# Patient Record
Sex: FEMALE | Race: WHITE | NOT HISPANIC OR LATINO | Employment: FULL TIME | URBAN - METROPOLITAN AREA
[De-identification: names, ages, dates, MRNs, and addresses within clinical notes are randomized per-mention and may not be internally consistent; named-entity substitution may affect disease eponyms.]

---

## 2019-05-27 ENCOUNTER — HOSPITAL ENCOUNTER (INPATIENT)
Facility: HOSPITAL | Age: 29
LOS: 1 days | Discharge: HOME | End: 2019-05-28
Attending: OBSTETRICS & GYNECOLOGY | Admitting: OBSTETRICS & GYNECOLOGY
Payer: MEDICAID

## 2019-05-27 DIAGNOSIS — O36.4XX0 IUFD AT 20 WEEKS OR MORE OF GESTATION: Primary | ICD-10-CM

## 2019-05-27 DIAGNOSIS — O09.32 NO PRENATAL CARE IN CURRENT PREGNANCY IN SECOND TRIMESTER: ICD-10-CM

## 2019-05-27 LAB
ABO + RH BLD: NORMAL
AMPHET+METHAMPHET UR QL: NEGATIVE
BACTERIA #/AREA URNS HPF: ABNORMAL /HPF
BARBITURATES UR QL SCN>200 NG/ML: NEGATIVE
BASOPHILS # BLD AUTO: 0.03 K/UL (ref 0–0.2)
BASOPHILS NFR BLD: 0.2 % (ref 0–1.9)
BENZODIAZ UR QL SCN>200 NG/ML: NEGATIVE
BILIRUB UR QL STRIP: ABNORMAL
BLD GP AB SCN CELLS X3 SERPL QL: NORMAL
BZE UR QL SCN: NEGATIVE
CANNABINOIDS UR QL SCN: NEGATIVE
CLARITY UR: ABNORMAL
COLOR UR: ABNORMAL
CREAT UR-MCNC: 75.7 MG/DL (ref 15–325)
DIFFERENTIAL METHOD: ABNORMAL
EOSINOPHIL # BLD AUTO: 0.3 K/UL (ref 0–0.5)
EOSINOPHIL NFR BLD: 2 % (ref 0–8)
ERYTHROCYTE [DISTWIDTH] IN BLOOD BY AUTOMATED COUNT: 12.8 % (ref 11.5–14.5)
GLUCOSE UR QL STRIP: ABNORMAL
HCT VFR BLD AUTO: 29.7 % (ref 37–48.5)
HGB BLD-MCNC: 10.4 G/DL (ref 12–16)
HGB UR QL STRIP: ABNORMAL
IMM GRANULOCYTES # BLD AUTO: 0.13 K/UL (ref 0–0.04)
IMM GRANULOCYTES NFR BLD AUTO: 0.9 % (ref 0–0.5)
KETONES UR QL STRIP: ABNORMAL
LEUKOCYTE ESTERASE UR QL STRIP: ABNORMAL
LYMPHOCYTES # BLD AUTO: 2.8 K/UL (ref 1–4.8)
LYMPHOCYTES NFR BLD: 18.8 % (ref 18–48)
MCH RBC QN AUTO: 32.7 PG (ref 27–31)
MCHC RBC AUTO-ENTMCNC: 35 G/DL (ref 32–36)
MCV RBC AUTO: 93 FL (ref 82–98)
MICROSCOPIC COMMENT: ABNORMAL
MONOCYTES # BLD AUTO: 0.7 K/UL (ref 0.3–1)
MONOCYTES NFR BLD: 4.8 % (ref 4–15)
NEUTROPHILS # BLD AUTO: 10.9 K/UL (ref 1.8–7.7)
NEUTROPHILS NFR BLD: 73.3 % (ref 38–73)
NITRITE UR QL STRIP: ABNORMAL
NRBC BLD-RTO: 0 /100 WBC
OPIATES UR QL SCN: NEGATIVE
PCP UR QL SCN>25 NG/ML: NEGATIVE
PH UR STRIP: ABNORMAL [PH] (ref 5–8)
PLATELET # BLD AUTO: 217 K/UL (ref 150–350)
PMV BLD AUTO: 9.8 FL (ref 9.2–12.9)
PROT UR QL STRIP: ABNORMAL
RBC # BLD AUTO: 3.18 M/UL (ref 4–5.4)
RBC #/AREA URNS HPF: >100 /HPF (ref 0–4)
SP GR UR STRIP: ABNORMAL (ref 1–1.03)
TOXICOLOGY INFORMATION: NORMAL
URN SPEC COLLECT METH UR: ABNORMAL
UROBILINOGEN UR STRIP-ACNC: ABNORMAL EU/DL
WBC # BLD AUTO: 14.86 K/UL (ref 3.9–12.7)

## 2019-05-27 PROCEDURE — 99211 OFF/OP EST MAY X REQ PHY/QHP: CPT | Mod: 25

## 2019-05-27 PROCEDURE — 88305 TISSUE EXAM BY PATHOLOGIST: CPT | Performed by: PATHOLOGY

## 2019-05-27 PROCEDURE — 85025 COMPLETE CBC W/AUTO DIFF WBC: CPT

## 2019-05-27 PROCEDURE — 63600175 PHARM REV CODE 636 W HCPCS: Performed by: OBSTETRICS & GYNECOLOGY

## 2019-05-27 PROCEDURE — 86901 BLOOD TYPING SEROLOGIC RH(D): CPT

## 2019-05-27 PROCEDURE — 87491 CHLMYD TRACH DNA AMP PROBE: CPT

## 2019-05-27 PROCEDURE — 88305 TISSUE EXAM BY PATHOLOGIST: CPT | Mod: 26,,, | Performed by: PATHOLOGY

## 2019-05-27 PROCEDURE — 25000003 PHARM REV CODE 250: Performed by: OBSTETRICS & GYNECOLOGY

## 2019-05-27 PROCEDURE — 36415 COLL VENOUS BLD VENIPUNCTURE: CPT

## 2019-05-27 PROCEDURE — 11000001 HC ACUTE MED/SURG PRIVATE ROOM

## 2019-05-27 PROCEDURE — 72200004 HC VAGINAL DELIVERY LEVEL I

## 2019-05-27 PROCEDURE — 80307 DRUG TEST PRSMV CHEM ANLYZR: CPT

## 2019-05-27 PROCEDURE — 88305 TISSUE SPECIMEN TO PATHOLOGY: ICD-10-PCS | Mod: 26,,, | Performed by: PATHOLOGY

## 2019-05-27 PROCEDURE — 72100002 HC LABOR CARE, 1ST 8 HOURS

## 2019-05-27 PROCEDURE — 72200005 HC VAGINAL DELIVERY LEVEL II

## 2019-05-27 PROCEDURE — 96374 THER/PROPH/DIAG INJ IV PUSH: CPT

## 2019-05-27 PROCEDURE — 81000 URINALYSIS NONAUTO W/SCOPE: CPT | Mod: 59

## 2019-05-27 PROCEDURE — 63600175 PHARM REV CODE 636 W HCPCS

## 2019-05-27 RX ORDER — DOCUSATE SODIUM 100 MG/1
200 CAPSULE, LIQUID FILLED ORAL 2 TIMES DAILY PRN
Status: DISCONTINUED | OUTPATIENT
Start: 2019-05-27 | End: 2019-05-28 | Stop reason: HOSPADM

## 2019-05-27 RX ORDER — PROMETHAZINE HYDROCHLORIDE 25 MG/ML
INJECTION, SOLUTION INTRAMUSCULAR; INTRAVENOUS
Status: COMPLETED
Start: 2019-05-27 | End: 2019-05-27

## 2019-05-27 RX ORDER — DIPHENHYDRAMINE HYDROCHLORIDE 50 MG/ML
25 INJECTION INTRAMUSCULAR; INTRAVENOUS EVERY 4 HOURS PRN
Status: DISCONTINUED | OUTPATIENT
Start: 2019-05-27 | End: 2019-05-28 | Stop reason: HOSPADM

## 2019-05-27 RX ORDER — HYDROCODONE BITARTRATE AND ACETAMINOPHEN 7.5; 325 MG/1; MG/1
1 TABLET ORAL EVERY 4 HOURS PRN
Status: DISCONTINUED | OUTPATIENT
Start: 2019-05-27 | End: 2019-05-28 | Stop reason: HOSPADM

## 2019-05-27 RX ORDER — ACETAMINOPHEN 325 MG/1
650 TABLET ORAL EVERY 6 HOURS PRN
Status: DISCONTINUED | OUTPATIENT
Start: 2019-05-27 | End: 2019-05-28 | Stop reason: HOSPADM

## 2019-05-27 RX ORDER — ONDANSETRON 4 MG/1
8 TABLET, ORALLY DISINTEGRATING ORAL EVERY 8 HOURS PRN
Status: DISCONTINUED | OUTPATIENT
Start: 2019-05-27 | End: 2019-05-28 | Stop reason: HOSPADM

## 2019-05-27 RX ORDER — DIPHENHYDRAMINE HCL 25 MG
25 CAPSULE ORAL EVERY 4 HOURS PRN
Status: DISCONTINUED | OUTPATIENT
Start: 2019-05-27 | End: 2019-05-28 | Stop reason: HOSPADM

## 2019-05-27 RX ORDER — BUTORPHANOL TARTRATE 2 MG/ML
INJECTION INTRAMUSCULAR; INTRAVENOUS
Status: COMPLETED
Start: 2019-05-27 | End: 2019-05-27

## 2019-05-27 RX ORDER — SODIUM CHLORIDE 0.9 % (FLUSH) 0.9 %
3 SYRINGE (ML) INJECTION EVERY 8 HOURS
Status: DISCONTINUED | OUTPATIENT
Start: 2019-05-27 | End: 2019-05-28 | Stop reason: HOSPADM

## 2019-05-27 RX ORDER — OXYTOCIN/RINGER'S LACTATE 20/1000 ML
41.65 PLASTIC BAG, INJECTION (ML) INTRAVENOUS CONTINUOUS
Status: DISCONTINUED | OUTPATIENT
Start: 2019-05-27 | End: 2019-05-28

## 2019-05-27 RX ORDER — HYDROCODONE BITARTRATE AND ACETAMINOPHEN 10; 325 MG/1; MG/1
1 TABLET ORAL EVERY 4 HOURS PRN
Status: DISCONTINUED | OUTPATIENT
Start: 2019-05-27 | End: 2019-05-28 | Stop reason: HOSPADM

## 2019-05-27 RX ORDER — OXYTOCIN/RINGER'S LACTATE 20/1000 ML
PLASTIC BAG, INJECTION (ML) INTRAVENOUS
Status: COMPLETED
Start: 2019-05-27 | End: 2019-05-27

## 2019-05-27 RX ADMIN — Medication 333 MILLI-UNITS/MIN: at 04:05

## 2019-05-27 RX ADMIN — Medication 333 MILLI-UNITS/MIN: at 05:05

## 2019-05-27 RX ADMIN — PROMETHAZINE HYDROCHLORIDE: 25 INJECTION INTRAMUSCULAR; INTRAVENOUS at 04:05

## 2019-05-27 RX ADMIN — HYDROCODONE BITARTRATE AND ACETAMINOPHEN 1 TABLET: 10; 325 TABLET ORAL at 08:05

## 2019-05-27 RX ADMIN — BUTORPHANOL TARTRATE: 2 INJECTION, SOLUTION INTRAMUSCULAR; INTRAVENOUS at 04:05

## 2019-05-27 NOTE — H&P
[facility] - Labor and Delivery  Obstetrics  History & Physical    Patient Name: Felicitas Santos  MRN: [id number]  Admission Date: 2019  Primary Care Provider: Primary Doctor No    Subjective:     Principal Problem:IUFD at 20 weeks or more of gestation    History of Present Illness:  28-year-old  89 presents to Labor and delivery with no prenatal care. The patient was only seen [facility] the [facility] department and was given an estimated due date based on her LMP.  NILE 2019.  With a gestational age of 23-,1/7 weeks.  She presented with complaints of contractions.  The nurse was unable to ascertain fetal heart tones.  Bedside ultrasound confirmed no fetal heart tones and the fetus in the earl breech position inside the vagina.  She was grossly ruptured.  Within a minute of performing the ultrasound the patient delivered the fetus as well as the placenta.  See the delivery summary.    Obstetric HPI:  Patient reports Date/time of onset:  Today at 1:30 a.m. contractions, active fetal movement, patient reported that she felt her fetus move this morning.  Yes vaginal bleeding , Yes loss of fluid     This pregnancy has been complicated by no prenatal care.  History of  delivery x2.  Tobacco abuse.    OB History    Para Term  AB Living   5 2 0 2 0 2   SAB TAB Ectopic Multiple Live Births   0 0 0 0 2      # Outcome Date GA Lbr Tru/2nd Weight Sex Delivery Anes PTL Lv   5 Current            4             3             2             1               No past medical history on file.  No past surgical history on file.    No medications prior to admission.       Review of patient's allergies indicates:  Allergies not on file     Family History     None        Tobacco Use    Smoking status: Not on file   Substance and Sexual Activity    Alcohol use: Not on file    Drug use: Not on file    Sexual activity: Not on file     Review of Systems    Constitutional: Negative.    HENT: Negative.    Eyes: Negative.    Respiratory: Negative.    Cardiovascular: Negative.    Gastrointestinal: Positive for abdominal pain.   Endocrine: Negative.    Genitourinary: Positive for pelvic pain, vaginal bleeding and vaginal discharge.   Musculoskeletal: Negative.    Integumentary:  Negative.   Neurological: Negative.    Hematological: Negative.    Psychiatric/Behavioral: Negative.    Breast: negative.       Objective:     Vital Signs (Most Recent):    Vital Signs (24h Range):  BP: ()/()   Arterial Line BP: ()/()         There is no height or weight on file to calculate BMI.    FHT:  Unable to retrieve fetal heart tones.  Bedside ultrasound negative for fetal heart tones.  TOCO:  Q 3 minutes    Physical Exam:   Constitutional: She is oriented to person, place, and time. She appears well-developed and well-nourished. She appears distressed.    HENT:   Head: Normocephalic and atraumatic.   Right Ear: External ear normal.   Left Ear: External ear normal.   Nose: Nose normal.   Mouth/Throat: Oropharynx is clear and moist.    Eyes: Pupils are equal, round, and reactive to light. Conjunctivae and EOM are normal.    Neck: Normal range of motion. Neck supple.    Cardiovascular: Normal rate, regular rhythm, normal heart sounds and intact distal pulses.     Pulmonary/Chest: Effort normal and breath sounds normal.        Abdominal: Soft. Bowel sounds are normal.             Musculoskeletal: Normal range of motion and moves all extremeties.       Neurological: She is alert and oriented to person, place, and time. She has normal reflexes.    Skin: Skin is warm and dry.    Psychiatric: She has a normal mood and affect. Her behavior is normal. Judgment and thought content normal.       Cervix:  Dilation:  8  Effacement:  100%  Station: 2  Presentation: Breech     Significant Labs:  No results found for: GROUPTRH, HEPBSAG, RUBELLAIGGSC, STREPBCULT, AFP, SECWSLA1ZC    I have personallly  reviewed all pertinent lab results from the last 24 hours.  Recent Lab Results       19  1614        Baso # 0.03     Basophil% 0.2     Differential Method Automated     Eos # 0.3     Eosinophil% 2.0     Gran # (ANC) 10.9     Gran% 73.3     Hematocrit 29.7     Hemoglobin 10.4     Immature Grans (Abs) 0.13  Comment:  Mild elevation in immature granulocytes is non specific and   can be seen in a variety of conditions including stress response,   acute inflammation, trauma and pregnancy. Correlation with other   laboratory and clinical findings is essential.       Immature Granulocytes 0.9     Lymph # 2.8     Lymph% 18.8     MCH 32.7     MCHC 35.0     MCV 93     Mono # 0.7     Mono% 4.8     MPV 9.8     nRBC 0     Platelets 217     RBC 3.18     RDW 12.8     WBC 14.86           Assessment/Plan:     28 y.o. female  at 23w1d for:  IUFD.   labor.  No prenatal care.  See delivery summary.            Cody Payne MD  Obstetrics  [facility] - Labor and Delivery

## 2019-05-28 VITALS
BODY MASS INDEX: 28.41 KG/M2 | DIASTOLIC BLOOD PRESSURE: 60 MMHG | HEIGHT: 67 IN | RESPIRATION RATE: 18 BRPM | TEMPERATURE: 98 F | WEIGHT: 181 LBS | SYSTOLIC BLOOD PRESSURE: 105 MMHG | OXYGEN SATURATION: 97 % | HEART RATE: 86 BPM

## 2019-05-28 LAB
ABO + RH BLD: NORMAL
AMPHET+METHAMPHET UR QL: NEGATIVE
BACTERIA #/AREA URNS HPF: ABNORMAL /HPF
BARBITURATES UR QL SCN>200 NG/ML: NEGATIVE
BENZODIAZ UR QL SCN>200 NG/ML: NEGATIVE
BILIRUB UR QL STRIP: NEGATIVE
BLD GP AB SCN CELLS X3 SERPL QL: NORMAL
BZE UR QL SCN: NEGATIVE
C TRACH DNA SPEC QL NAA+PROBE: NOT DETECTED
CANNABINOIDS UR QL SCN: NEGATIVE
CLARITY UR: ABNORMAL
CMV IGG SERPL QL IA: REACTIVE
COLOR UR: ABNORMAL
CREAT UR-MCNC: 60.7 MG/DL (ref 15–325)
ESTIMATED AVG GLUCOSE: 74 MG/DL (ref 68–131)
FETAL RBC/1000 RBC BLD KLEIH BETKE-RTO: NORMAL
GLUCOSE UR QL STRIP: NEGATIVE
HBA1C MFR BLD HPLC: 4.2 % (ref 4.5–6.2)
HGB UR QL STRIP: ABNORMAL
HYALINE CASTS #/AREA URNS LPF: 0 /LPF
INR PPP: 0.9 (ref 0.8–1.2)
KETONES UR QL STRIP: NEGATIVE
LEUKOCYTE ESTERASE UR QL STRIP: NEGATIVE
MICROSCOPIC COMMENT: ABNORMAL
N GONORRHOEA DNA SPEC QL NAA+PROBE: NOT DETECTED
NITRITE UR QL STRIP: NEGATIVE
OPIATES UR QL SCN: NEGATIVE
PATHOLOGIST INTERPRETATION KBT: NORMAL
PCP UR QL SCN>25 NG/ML: NEGATIVE
PH UR STRIP: >8 [PH] (ref 5–8)
PROT UR QL STRIP: ABNORMAL
PROTHROMBIN TIME: 10.6 SEC (ref 9–12.5)
RBC #/AREA URNS HPF: 100 /HPF (ref 0–4)
SP GR UR STRIP: 1.01 (ref 1–1.03)
TOXICOLOGY INFORMATION: NORMAL
TSH SERPL DL<=0.005 MIU/L-ACNC: 1.25 UIU/ML (ref 0.34–5.6)
URN SPEC COLLECT METH UR: ABNORMAL
UROBILINOGEN UR STRIP-ACNC: 1 EU/DL
WBC #/AREA URNS HPF: 0 /HPF (ref 0–5)

## 2019-05-28 PROCEDURE — 85610 PROTHROMBIN TIME: CPT

## 2019-05-28 PROCEDURE — 85460 HEMOGLOBIN FETAL: CPT

## 2019-05-28 PROCEDURE — 81000 URINALYSIS NONAUTO W/SCOPE: CPT | Mod: 59

## 2019-05-28 PROCEDURE — 86147 CARDIOLIPIN ANTIBODY EA IG: CPT

## 2019-05-28 PROCEDURE — 25000003 PHARM REV CODE 250

## 2019-05-28 PROCEDURE — 86747 PARVOVIRUS ANTIBODY: CPT

## 2019-05-28 PROCEDURE — 85060 BLOOD SMEAR INTERPRETATION: CPT | Mod: ,,, | Performed by: PATHOLOGY

## 2019-05-28 PROCEDURE — 36415 COLL VENOUS BLD VENIPUNCTURE: CPT

## 2019-05-28 PROCEDURE — 87529 HSV DNA AMP PROBE: CPT

## 2019-05-28 PROCEDURE — 86644 CMV ANTIBODY: CPT

## 2019-05-28 PROCEDURE — 85300 ANTITHROMBIN III ACTIVITY: CPT

## 2019-05-28 PROCEDURE — 86901 BLOOD TYPING SEROLOGIC RH(D): CPT

## 2019-05-28 PROCEDURE — 85060 PATHOLOGIST INTERPRETATION KBT: ICD-10-PCS | Mod: ,,, | Performed by: PATHOLOGY

## 2019-05-28 PROCEDURE — 80307 DRUG TEST PRSMV CHEM ANLYZR: CPT

## 2019-05-28 PROCEDURE — 83036 HEMOGLOBIN GLYCOSYLATED A1C: CPT

## 2019-05-28 PROCEDURE — 25000003 PHARM REV CODE 250: Performed by: OBSTETRICS & GYNECOLOGY

## 2019-05-28 PROCEDURE — 85613 RUSSELL VIPER VENOM DILUTED: CPT

## 2019-05-28 PROCEDURE — 86592 SYPHILIS TEST NON-TREP QUAL: CPT

## 2019-05-28 PROCEDURE — 84443 ASSAY THYROID STIM HORMONE: CPT

## 2019-05-28 PROCEDURE — 86850 RBC ANTIBODY SCREEN: CPT

## 2019-05-28 RX ORDER — OXYTOCIN/RINGER'S LACTATE 20/1000 ML
2 PLASTIC BAG, INJECTION (ML) INTRAVENOUS CONTINUOUS
Status: DISCONTINUED | OUTPATIENT
Start: 2019-05-28 | End: 2019-05-28

## 2019-05-28 RX ORDER — HYDROCODONE BITARTRATE AND ACETAMINOPHEN 7.5; 325 MG/1; MG/1
1 TABLET ORAL EVERY 8 HOURS PRN
Qty: 10 TABLET | Refills: 0 | Status: ON HOLD | OUTPATIENT
Start: 2019-05-28 | End: 2019-07-19 | Stop reason: SDUPTHER

## 2019-05-28 RX ORDER — DOCUSATE SODIUM 100 MG/1
200 CAPSULE, LIQUID FILLED ORAL 2 TIMES DAILY PRN
Refills: 0 | COMMUNITY
Start: 2019-05-28 | End: 2019-12-18

## 2019-05-28 RX ORDER — NAPROXEN 250 MG/1
500 TABLET ORAL ONCE
Status: COMPLETED | OUTPATIENT
Start: 2019-05-28 | End: 2019-05-28

## 2019-05-28 RX ORDER — NAPROXEN 250 MG/1
TABLET ORAL
Status: COMPLETED
Start: 2019-05-28 | End: 2019-05-28

## 2019-05-28 RX ADMIN — DOCUSATE SODIUM 200 MG: 100 CAPSULE, LIQUID FILLED ORAL at 09:05

## 2019-05-28 RX ADMIN — NAPROXEN 500 MG: 250 TABLET ORAL at 10:05

## 2019-05-28 RX ADMIN — HYDROCODONE BITARTRATE AND ACETAMINOPHEN 1 TABLET: 10; 325 TABLET ORAL at 09:05

## 2019-05-28 RX ADMIN — HYDROCODONE BITARTRATE AND ACETAMINOPHEN 1 TABLET: 10; 325 TABLET ORAL at 02:05

## 2019-05-28 NOTE — NURSING
Pt alert eating a hamburger at this time with family in room  IV LR with Pitocin complete, switched to LR at 125cc/hr. Jm doan

## 2019-05-28 NOTE — NURSING
PT IN ROOM 148.  IV HEPLOCKED.    FOOTPRINTED, DRESSED, TO MOTHER FOR GRIEVING/BONDING.  MOTHER'S AFFECT IS FLAT, DOES NOT SEEM EMOTIONALLY ATTACHED, JUST QUIET AND DOES NOT SAY MUCH.  MEMORY BOX WITH FOOTPRINTS PREPARED TO GIVE TO MOTHER PRIOR DISCHARGE.  BABY WAS IN MOTHERS ARMS FOR APPROX 45 MINS THEN SHE ADVISED OK TO RETURN BABY AND SEND TO  HOME.   HOME NOTIF BY RADHA HERNANDEZ.

## 2019-05-28 NOTE — L&D DELIVERY NOTE
[facility] - Labor and Delivery  Vaginal Delivery   Operative Note    SUMMARY     Normal spontaneous vaginal delivery of IUFD Pt ruptured while in the bathroom. No fetal heart tones at bedside US.Buttock in the vagina on initial exam.  Infant delivered position earl breech over intact perineum.  Nuchal cord: No.    Spontaneous delivery of placenta and IV pitocin given noting good uterine tone.  No lacerations noted.  Patient tolerated delivery well. Sponge needle and lap counted correctly x2.  Apgars 0,0 EBL= 100cc  IUFD panel ordered. Placenta to path.  Indications: IUFD at 20 weeks or more of gestation  Pregnancy complicated by:   Patient Active Problem List   Diagnosis    IUFD at 20 weeks or more of gestation     labor in second trimester with  delivery    No prenatal care in current pregnancy in second trimester     Admitting GA: 23w1d    This patient has no babies on file.

## 2019-05-28 NOTE — NURSING
Body of baby picked up by [facility] .  Will call family in AM for final burial arrangements. Pt notified

## 2019-05-28 NOTE — NURSING
New urine obtained for more tests  Blood drawn for lab  Vital signs taken, WNL  Vaginal wall swabs x 2 taken  Pt alert and c/o cramping, medicated.  Pt more talkative about baby now, states his name is Fleet.  Gave memory box with footprints, Emotional support offered.

## 2019-05-28 NOTE — NURSING
1535-Patient arrived to LDR1 as OB triage for c/o contractions that started around 1400 today. Disrobed and gowned. Urine cup provided for UA--LW.   1540-Called to room by patient's ; states she thinks her water has broken. Assisted patient back to bed; bloody show noted. Fetal monitors applied for NST but unable to find heart tones. Second nurse called to bedside to attempt to find fetal heart tones--LW.  1604-Called Dr. Payne to provide patient update and advise that both nurses have been unable to find fetal heart tones. Telephone orders received for STAT US, UDS, CBC, T&S, & insert Heplock IV--LW.   1610-Dr. Payne at bedside to perform US. No fetal heart tones detected per US. SVE performed per Dr. Payne and infant determined to be in vaginal vault--LW.   1614-Patient instructed to push per Dr. Payne.  of nonviable male with Dr. Payne present for delivery--LW.

## 2019-05-28 NOTE — NURSING
Spoke with Ms. Felicitas Santos and her  at patient's bedside prior to discharge. Offered resources for infant loss support. Gave information on speaking with Toña Huerta, Ph # [phone], President of [employer]. Toña will  parents with infant loss. Also offered [facility] Resources and phone number, [phone]. Parents received written information with contact numbers. Parents verbalized understanding . Mom tearful but receptive.

## 2019-05-28 NOTE — NURSING
1015-Discharge instructions for Stillbirth discussed with patient and copy provided. Instructed to f/u with Dr. Payne on 6/6/19 @ 1400. Prescription for Norco given to patient and instructed on use. Also informed patient to begin taking Colace 200 mg PO BID and Naproxen 500 mg PO q 8 hours for cramping. Patient verbalizes understanding of all materials discussed. Denies any further needs or concerns at this time--LW.   1023-Discharged home. Ambulated to POV per request with assistance from . No s/s distress noted at time of discharge--LW.

## 2019-05-28 NOTE — PLAN OF CARE
19 1519   Final Note   Assessment Type Final Discharge Note   Anticipated Discharge Disposition Home   Hospital Follow Up  Appt(s) scheduled? Yes   Discharge plans and expectations educations in teach back method with documentation complete? Yes   PT ADMITTED WITH IUFD AT 20 WEEKS OR MORE. SHE IS REFERRED TO [facility] RESOURCESFOR F/U EMOTIONAL SUPPORT.  NP HAS CONTACT WITH SUPPORT PERSON WHO WORKS WITH [employer] AND SHE OFFERED TO DISCUSS THIS RESOURCE  WITH PT. PT IS DISCHARGED HOME AFTER RESOURCE DISCUSSION. F/U APPOINTMENT MADE WITH DR HARPER.

## 2019-05-29 LAB
HSV1 DNA SPEC QL NAA+PROBE: NEGATIVE
HSV2 DNA SPEC QL NAA+PROBE: NEGATIVE
RPR SER QL: NORMAL
SPECIMEN SOURCE: NORMAL

## 2019-05-29 NOTE — DISCHARGE SUMMARY
[facility] - Post Partum  Obstetrics & Gynecology  Discharge Summary    Patient Name: Felicitas Santos  MRN: [id number]  Admission Date: 2019  Hospital Length of Stay: 1 days  Discharge Date and Time: 2019 10:23 AM  Attending Physician: No att. providers found   Discharging Provider: Cody Payne MD  Primary Care Provider: Primary Doctor Leeann    HPI:  28-year-old  89 presented to Labor and delivery with no prenatal care. According to an LMP the patient was 23 weeks and 3 days.  The patient presented with complaints of liz.  Upon arrival the patient was noted to have spontaneous rupture of membranes.  The nurse was unable to ascertain fetal heart tones.  Bedside ultrasound confirmed IUFD.    Hospital Course:  The patient precipitously delivered in the breech presentation.  Fluid was clear.  There were no gross anomalies of the fetus.  It was a male fetus.  The patient deliver the placenta with no complications. She was held overnight to assess bleeding.  The patient did well and was discharged to home on post delivery day 1.    * No surgery found *     Consults:     Significant Diagnostic Studies:   Labs: All labs within the past 24 hours have been reviewed  Specimen (12h ago, onward)    None          Pending Diagnostic Studies:     Procedure Component Value Units Date/Time    Antithrombin III [[id number]] Collected:  19 0200    Order Status:  Sent Lab Status:  In process Updated:  19 0231    Specimen:  Blood     Cardiolipin antibody [[id number]] Collected:  19    Order Status:  Sent Lab Status:  In process Updated:  19 1201    Specimen:  Blood     DRVVT [[id number]] Collected:  19    Order Status:  Sent Lab Status:  In process Updated:  19 1002    Specimen:  Blood     Factor 5 leiden [[id number]] Collected:  19    Order Status:  Sent Lab Status:  In process Updated:  19 0222    Specimen:  Blood     Parvovirus B19  antibody, IgG and IgM [[id number]] Collected:  19    Order Status:  Sent Lab Status:  In process Updated:  19 120    Specimen:  Blood     Prothrombin gene mutation [[id number]] Collected:  19    Order Status:  Sent Lab Status:  In process Updated:  19    Specimen:  Blood     RPR [[id number]] Collected:  19    Order Status:  Sent Lab Status:  In process Updated:  19 120    Specimen:  Blood     Rubella antibody, IgG [[id number]] Collected:  19    Order Status:  Sent Lab Status:  In process Updated:  19    Specimen:  Blood     Tissue Specimen to Pathology [[id number]] Collected:  19 1618    Order Status:  Sent Lab Status:  In process Updated:  19 0734    Specimen:  Tissue from Placenta, less than 3rd trimester     Toxoplasma gondii antibody, IgM [[id number]] Collected:  19    Order Status:  Sent Lab Status:  In process Updated:  19    Specimen:  Blood         Final Active Diagnoses:    Diagnosis Date Noted POA    PRINCIPAL PROBLEM:  IUFD at 20 weeks or more of gestation [O36.4XX0] 2019 Yes     labor in second trimester with  delivery [O60.10X0] 2019 Yes    No prenatal care in current pregnancy in second trimester [O09.32] 2019 Not Applicable      Problems Resolved During this Admission:        Discharged Condition: good    Disposition: Home or Self Care    Follow Up:  Follow-up Information     Cody Payne MD. Go on 2019.    Specialty:  Obstetrics  Why:  appointment time: 2:00pm  Contact information:  [address] [place] [id number]  [phone]                 Patient Instructions:      Other restrictions (specify):   Order Comments: Pelvic rest: no sex tampons or douching     Notify your health care provider if you experience any of the following:  temperature >100.4     Notify your health care provider if you experience any of the following:  persistent  nausea and vomiting or diarrhea     Notify your health care provider if you experience any of the following:  severe persistent headache     Notify your health care provider if you experience any of the following:  persistent dizziness, light-headedness, or visual disturbances     Notify your health care provider if you experience any of the following:   Order Comments: 1.  Unusual vaginal discharge.  2.  Pain or burning with urination  3.  Swelling of face, hands, legs,etc that occurs suddenly     Medications:  Reconciled Home Medications:      Medication List      START taking these medications    docusate sodium 100 MG capsule  Commonly known as:  COLACE  Take 2 capsules (200 mg total) by mouth 2 (two) times daily as needed for Constipation.     HYDROcodone-acetaminophen 7.5-325 mg per tablet  Commonly known as:  NORCO  Take 1 tablet by mouth every 8 (eight) hours as needed.            Cody Payne MD  Obstetrics & Gynecology  [facility] - [facility] Part[facility]

## 2019-05-30 LAB
AT III ACT/NOR PPP CHRO: 88 % (ref 83–118)
LA PPP-IMP: NEGATIVE
PARVOVIRUS B19 ABS IGG & IGM: NORMAL
PARVOVIRUS B19 IGG ANTIBODY: NEGATIVE
PARVOVIRUS B19 IGM ANTIBODY: NEGATIVE

## 2019-05-31 LAB
CARDIOLIPIN IGG SER IA-ACNC: <9.4 GPL (ref 0–14.99)
CARDIOLIPIN IGM SER IA-ACNC: <9.4 MPL (ref 0–12.49)

## 2019-07-19 ENCOUNTER — HOSPITAL ENCOUNTER (OUTPATIENT)
Facility: HOSPITAL | Age: 29
Discharge: HOME | End: 2019-07-19
Attending: OBSTETRICS & GYNECOLOGY | Admitting: OBSTETRICS & GYNECOLOGY
Payer: MEDICAID

## 2019-07-19 ENCOUNTER — ANESTHESIA EVENT (OUTPATIENT)
Dept: SURGERY | Facility: HOSPITAL | Age: 29
End: 2019-07-19
Payer: MEDICAID

## 2019-07-19 ENCOUNTER — ANESTHESIA (OUTPATIENT)
Dept: SURGERY | Facility: HOSPITAL | Age: 29
End: 2019-07-19
Payer: MEDICAID

## 2019-07-19 VITALS
OXYGEN SATURATION: 99 % | HEIGHT: 67 IN | HEART RATE: 79 BPM | TEMPERATURE: 98 F | SYSTOLIC BLOOD PRESSURE: 132 MMHG | BODY MASS INDEX: 23.54 KG/M2 | RESPIRATION RATE: 18 BRPM | DIASTOLIC BLOOD PRESSURE: 85 MMHG | WEIGHT: 150 LBS

## 2019-07-19 DIAGNOSIS — Z30.017 INSERTION OF IMPLANTABLE SUBDERMAL CONTRACEPTIVE: ICD-10-CM

## 2019-07-19 DIAGNOSIS — D50.0 IRON DEFICIENCY ANEMIA DUE TO CHRONIC BLOOD LOSS: ICD-10-CM

## 2019-07-19 PROBLEM — O09.32 NO PRENATAL CARE IN CURRENT PREGNANCY IN SECOND TRIMESTER: Status: RESOLVED | Noted: 2019-05-27 | Resolved: 2019-07-19

## 2019-07-19 PROBLEM — O36.4XX0 IUFD AT 20 WEEKS OR MORE OF GESTATION: Status: RESOLVED | Noted: 2019-05-27 | Resolved: 2019-07-19

## 2019-07-19 LAB
BASOPHILS # BLD AUTO: 0.04 K/UL (ref 0–0.2)
BASOPHILS NFR BLD: 0.4 % (ref 0–1.9)
DIFFERENTIAL METHOD: ABNORMAL
EOSINOPHIL # BLD AUTO: 0.2 K/UL (ref 0–0.5)
EOSINOPHIL NFR BLD: 1.7 % (ref 0–8)
ERYTHROCYTE [DISTWIDTH] IN BLOOD BY AUTOMATED COUNT: 13.6 % (ref 11.5–14.5)
ERYTHROCYTE [SEDIMENTATION RATE] IN BLOOD BY WESTERGREN METHOD: 10 MM/HR (ref 0–20)
HCG INTACT+B SERPL-ACNC: 0.71 MIU/ML
HCT VFR BLD AUTO: 32.8 % (ref 37–48.5)
HGB BLD-MCNC: 11 G/DL (ref 12–16)
IMM GRANULOCYTES # BLD AUTO: 0.04 K/UL (ref 0–0.04)
IMM GRANULOCYTES NFR BLD AUTO: 0.4 % (ref 0–0.5)
LYMPHOCYTES # BLD AUTO: 3.7 K/UL (ref 1–4.8)
LYMPHOCYTES NFR BLD: 35.4 % (ref 18–48)
MCH RBC QN AUTO: 31.5 PG (ref 27–31)
MCHC RBC AUTO-ENTMCNC: 33.5 G/DL (ref 32–36)
MCV RBC AUTO: 94 FL (ref 82–98)
MONOCYTES # BLD AUTO: 0.5 K/UL (ref 0.3–1)
MONOCYTES NFR BLD: 5.1 % (ref 4–15)
NEUTROPHILS # BLD AUTO: 6 K/UL (ref 1.8–7.7)
NEUTROPHILS NFR BLD: 57 % (ref 38–73)
NRBC BLD-RTO: 0 /100 WBC
PLATELET # BLD AUTO: 250 K/UL (ref 150–350)
PMV BLD AUTO: 10 FL (ref 9.2–12.9)
RBC # BLD AUTO: 3.49 M/UL (ref 4–5.4)
WBC # BLD AUTO: 10.52 K/UL (ref 3.9–12.7)

## 2019-07-19 PROCEDURE — 85651 RBC SED RATE NONAUTOMATED: CPT

## 2019-07-19 PROCEDURE — 36415 COLL VENOUS BLD VENIPUNCTURE: CPT

## 2019-07-19 PROCEDURE — S0020 INJECTION, BUPIVICAINE HYDRO: HCPCS | Performed by: OBSTETRICS & GYNECOLOGY

## 2019-07-19 PROCEDURE — 37000009 HC ANESTHESIA EA ADD 15 MINS: Performed by: OBSTETRICS & GYNECOLOGY

## 2019-07-19 PROCEDURE — 88305 TISSUE EXAM BY PATHOLOGIST: CPT | Performed by: PATHOLOGY

## 2019-07-19 PROCEDURE — 36000707: Performed by: OBSTETRICS & GYNECOLOGY

## 2019-07-19 PROCEDURE — 37000008 HC ANESTHESIA 1ST 15 MINUTES: Performed by: OBSTETRICS & GYNECOLOGY

## 2019-07-19 PROCEDURE — 85025 COMPLETE CBC W/AUTO DIFF WBC: CPT

## 2019-07-19 PROCEDURE — 63600175 PHARM REV CODE 636 W HCPCS: Performed by: OBSTETRICS & GYNECOLOGY

## 2019-07-19 PROCEDURE — 71000033 HC RECOVERY, INTIAL HOUR: Performed by: OBSTETRICS & GYNECOLOGY

## 2019-07-19 PROCEDURE — S0077 INJECTION, CLINDAMYCIN PHOSP: HCPCS | Performed by: OBSTETRICS & GYNECOLOGY

## 2019-07-19 PROCEDURE — 63600175 PHARM REV CODE 636 W HCPCS: Performed by: NURSE ANESTHETIST, CERTIFIED REGISTERED

## 2019-07-19 PROCEDURE — D9220A PRA ANESTHESIA: ICD-10-PCS | Mod: CRNA,,, | Performed by: NURSE ANESTHETIST, CERTIFIED REGISTERED

## 2019-07-19 PROCEDURE — 27800903 OPTIME MED/SURG SUP & DEVICES OTHER IMPLANTS: Performed by: OBSTETRICS & GYNECOLOGY

## 2019-07-19 PROCEDURE — 71000015 HC POSTOP RECOV 1ST HR: Performed by: OBSTETRICS & GYNECOLOGY

## 2019-07-19 PROCEDURE — 36000706: Performed by: OBSTETRICS & GYNECOLOGY

## 2019-07-19 PROCEDURE — 00952 ANES VAG PX HYSTSC&/HSG: CPT | Performed by: OBSTETRICS & GYNECOLOGY

## 2019-07-19 PROCEDURE — C1782 MORCELLATOR: HCPCS | Performed by: OBSTETRICS & GYNECOLOGY

## 2019-07-19 PROCEDURE — 88305 TISSUE EXAM BY PATHOLOGIST: CPT | Mod: 26,,, | Performed by: PATHOLOGY

## 2019-07-19 PROCEDURE — 88305 TISSUE SPECIMEN TO PATHOLOGY - SURGERY: ICD-10-PCS | Mod: 26,,, | Performed by: PATHOLOGY

## 2019-07-19 PROCEDURE — 27201423 OPTIME MED/SURG SUP & DEVICES STERILE SUPPLY: Performed by: OBSTETRICS & GYNECOLOGY

## 2019-07-19 PROCEDURE — 84702 CHORIONIC GONADOTROPIN TEST: CPT

## 2019-07-19 PROCEDURE — D9220A PRA ANESTHESIA: Mod: CRNA,,, | Performed by: NURSE ANESTHETIST, CERTIFIED REGISTERED

## 2019-07-19 PROCEDURE — D9220A PRA ANESTHESIA: ICD-10-PCS | Mod: ANES,,, | Performed by: ANESTHESIOLOGY

## 2019-07-19 PROCEDURE — 25000003 PHARM REV CODE 250: Performed by: OBSTETRICS & GYNECOLOGY

## 2019-07-19 PROCEDURE — D9220A PRA ANESTHESIA: Mod: ANES,,, | Performed by: ANESTHESIOLOGY

## 2019-07-19 PROCEDURE — 25000003 PHARM REV CODE 250: Performed by: NURSE ANESTHETIST, CERTIFIED REGISTERED

## 2019-07-19 PROCEDURE — 25000003 PHARM REV CODE 250: Performed by: ANESTHESIOLOGY

## 2019-07-19 DEVICE — IMPLANTABLE DEVICE: Type: IMPLANTABLE DEVICE | Site: ARM | Status: FUNCTIONAL

## 2019-07-19 RX ORDER — ONDANSETRON 2 MG/ML
4 INJECTION INTRAMUSCULAR; INTRAVENOUS DAILY PRN
Status: DISCONTINUED | OUTPATIENT
Start: 2019-07-19 | End: 2019-07-19 | Stop reason: HOSPADM

## 2019-07-19 RX ORDER — ONDANSETRON 4 MG/1
8 TABLET, ORALLY DISINTEGRATING ORAL EVERY 8 HOURS PRN
Status: DISCONTINUED | OUTPATIENT
Start: 2019-07-19 | End: 2019-07-19 | Stop reason: HOSPADM

## 2019-07-19 RX ORDER — MORPHINE SULFATE 4 MG/ML
3 INJECTION, SOLUTION INTRAMUSCULAR; INTRAVENOUS
Status: DISCONTINUED | OUTPATIENT
Start: 2019-07-19 | End: 2019-07-19 | Stop reason: HOSPADM

## 2019-07-19 RX ORDER — SODIUM CHLORIDE, SODIUM LACTATE, POTASSIUM CHLORIDE, CALCIUM CHLORIDE 600; 310; 30; 20 MG/100ML; MG/100ML; MG/100ML; MG/100ML
INJECTION, SOLUTION INTRAVENOUS CONTINUOUS
Status: CANCELLED | OUTPATIENT
Start: 2019-07-19

## 2019-07-19 RX ORDER — BUPIVACAINE HYDROCHLORIDE 5 MG/ML
INJECTION, SOLUTION EPIDURAL; INTRACAUDAL
Status: DISCONTINUED | OUTPATIENT
Start: 2019-07-19 | End: 2019-07-19 | Stop reason: HOSPADM

## 2019-07-19 RX ORDER — HYDROMORPHONE HYDROCHLORIDE 2 MG/ML
1 INJECTION, SOLUTION INTRAMUSCULAR; INTRAVENOUS; SUBCUTANEOUS EVERY 4 HOURS PRN
Status: DISCONTINUED | OUTPATIENT
Start: 2019-07-19 | End: 2019-07-19 | Stop reason: HOSPADM

## 2019-07-19 RX ORDER — SODIUM CHLORIDE, SODIUM LACTATE, POTASSIUM CHLORIDE, CALCIUM CHLORIDE 600; 310; 30; 20 MG/100ML; MG/100ML; MG/100ML; MG/100ML
125 INJECTION, SOLUTION INTRAVENOUS CONTINUOUS
Status: DISCONTINUED | OUTPATIENT
Start: 2019-07-19 | End: 2019-07-19 | Stop reason: HOSPADM

## 2019-07-19 RX ORDER — DOXYCYCLINE 100 MG/1
100 CAPSULE ORAL EVERY 12 HOURS
Qty: 28 CAPSULE | Refills: 0 | Status: SHIPPED | OUTPATIENT
Start: 2019-07-19 | End: 2019-08-02

## 2019-07-19 RX ORDER — HYDROCODONE BITARTRATE AND ACETAMINOPHEN 7.5; 325 MG/1; MG/1
1 TABLET ORAL EVERY 8 HOURS PRN
Qty: 10 TABLET | Refills: 0 | Status: SHIPPED | OUTPATIENT
Start: 2019-07-19 | End: 2019-12-18

## 2019-07-19 RX ORDER — AMOXICILLIN 250 MG
1 CAPSULE ORAL 2 TIMES DAILY
COMMUNITY
Start: 2019-07-19 | End: 2019-12-18

## 2019-07-19 RX ORDER — ROCURONIUM BROMIDE 10 MG/ML
INJECTION, SOLUTION INTRAVENOUS
Status: DISCONTINUED | OUTPATIENT
Start: 2019-07-19 | End: 2019-07-19

## 2019-07-19 RX ORDER — LIDOCAINE HYDROCHLORIDE 10 MG/ML
1 INJECTION, SOLUTION EPIDURAL; INFILTRATION; INTRACAUDAL; PERINEURAL ONCE
Status: DISCONTINUED | OUTPATIENT
Start: 2019-07-19 | End: 2019-07-19 | Stop reason: HOSPADM

## 2019-07-19 RX ORDER — SODIUM CHLORIDE, SODIUM LACTATE, POTASSIUM CHLORIDE, CALCIUM CHLORIDE 600; 310; 30; 20 MG/100ML; MG/100ML; MG/100ML; MG/100ML
INJECTION, SOLUTION INTRAVENOUS CONTINUOUS
Status: DISCONTINUED | OUTPATIENT
Start: 2019-07-19 | End: 2019-07-19 | Stop reason: HOSPADM

## 2019-07-19 RX ORDER — IBUPROFEN 800 MG/1
800 TABLET ORAL EVERY 8 HOURS PRN
Qty: 30 TABLET | Refills: 1 | Status: SHIPPED | OUTPATIENT
Start: 2019-07-19 | End: 2019-12-18

## 2019-07-19 RX ORDER — CLINDAMYCIN PHOSPHATE 900 MG/50ML
900 INJECTION, SOLUTION INTRAVENOUS
Status: COMPLETED | OUTPATIENT
Start: 2019-07-19 | End: 2019-07-19

## 2019-07-19 RX ORDER — IBUPROFEN 600 MG/1
600 TABLET ORAL EVERY 6 HOURS PRN
Status: DISCONTINUED | OUTPATIENT
Start: 2019-07-19 | End: 2019-07-19 | Stop reason: HOSPADM

## 2019-07-19 RX ORDER — DIPHENHYDRAMINE HYDROCHLORIDE 50 MG/ML
12.5 INJECTION INTRAMUSCULAR; INTRAVENOUS
Status: DISCONTINUED | OUTPATIENT
Start: 2019-07-19 | End: 2019-07-19 | Stop reason: HOSPADM

## 2019-07-19 RX ORDER — MEPERIDINE HYDROCHLORIDE 50 MG/ML
INJECTION INTRAMUSCULAR; INTRAVENOUS; SUBCUTANEOUS
Status: DISCONTINUED | OUTPATIENT
Start: 2019-07-19 | End: 2019-07-19

## 2019-07-19 RX ORDER — DIPHENHYDRAMINE HCL 25 MG
25 CAPSULE ORAL EVERY 4 HOURS PRN
Status: DISCONTINUED | OUTPATIENT
Start: 2019-07-19 | End: 2019-07-19 | Stop reason: HOSPADM

## 2019-07-19 RX ORDER — SUCCINYLCHOLINE CHLORIDE 20 MG/ML
INJECTION INTRAMUSCULAR; INTRAVENOUS
Status: DISCONTINUED | OUTPATIENT
Start: 2019-07-19 | End: 2019-07-19

## 2019-07-19 RX ORDER — FAMOTIDINE 10 MG/ML
20 INJECTION INTRAVENOUS ONCE
Status: DISCONTINUED | OUTPATIENT
Start: 2019-07-19 | End: 2019-07-19 | Stop reason: HOSPADM

## 2019-07-19 RX ORDER — MIDAZOLAM HYDROCHLORIDE 1 MG/ML
INJECTION, SOLUTION INTRAMUSCULAR; INTRAVENOUS
Status: DISCONTINUED | OUTPATIENT
Start: 2019-07-19 | End: 2019-07-19

## 2019-07-19 RX ORDER — METRONIDAZOLE 500 MG/1
500 TABLET ORAL EVERY 12 HOURS
Qty: 28 TABLET | Refills: 0 | Status: SHIPPED | OUTPATIENT
Start: 2019-07-19 | End: 2019-08-02

## 2019-07-19 RX ORDER — PROPOFOL 10 MG/ML
VIAL (ML) INTRAVENOUS
Status: DISCONTINUED | OUTPATIENT
Start: 2019-07-19 | End: 2019-07-19

## 2019-07-19 RX ORDER — MORPHINE SULFATE 4 MG/ML
2 INJECTION, SOLUTION INTRAMUSCULAR; INTRAVENOUS
Status: DISCONTINUED | OUTPATIENT
Start: 2019-07-19 | End: 2019-07-19 | Stop reason: HOSPADM

## 2019-07-19 RX ORDER — DIPHENHYDRAMINE HYDROCHLORIDE 50 MG/ML
25 INJECTION INTRAMUSCULAR; INTRAVENOUS EVERY 4 HOURS PRN
Status: DISCONTINUED | OUTPATIENT
Start: 2019-07-19 | End: 2019-07-19 | Stop reason: HOSPADM

## 2019-07-19 RX ORDER — ONDANSETRON 2 MG/ML
INJECTION INTRAMUSCULAR; INTRAVENOUS
Status: DISCONTINUED | OUTPATIENT
Start: 2019-07-19 | End: 2019-07-19

## 2019-07-19 RX ORDER — MORPHINE SULFATE 4 MG/ML
2 INJECTION, SOLUTION INTRAMUSCULAR; INTRAVENOUS EVERY 5 MIN PRN
Status: DISCONTINUED | OUTPATIENT
Start: 2019-07-19 | End: 2019-07-19 | Stop reason: HOSPADM

## 2019-07-19 RX ADMIN — MEPERIDINE HYDROCHLORIDE 25 MG: 50 INJECTION INTRAMUSCULAR; INTRAVENOUS; SUBCUTANEOUS at 01:07

## 2019-07-19 RX ADMIN — SODIUM CHLORIDE, POTASSIUM CHLORIDE, SODIUM LACTATE AND CALCIUM CHLORIDE: 600; 310; 30; 20 INJECTION, SOLUTION INTRAVENOUS at 12:07

## 2019-07-19 RX ADMIN — SODIUM CHLORIDE, POTASSIUM CHLORIDE, SODIUM LACTATE AND CALCIUM CHLORIDE: 600; 310; 30; 20 INJECTION, SOLUTION INTRAVENOUS at 01:07

## 2019-07-19 RX ADMIN — SUCCINYLCHOLINE CHLORIDE 150 MG: 20 INJECTION, SOLUTION INTRAMUSCULAR; INTRAVENOUS at 01:07

## 2019-07-19 RX ADMIN — ONDANSETRON 4 MG: 2 INJECTION INTRAMUSCULAR; INTRAVENOUS at 01:07

## 2019-07-19 RX ADMIN — PROPOFOL 200 MG: 10 INJECTION, EMULSION INTRAVENOUS at 01:07

## 2019-07-19 RX ADMIN — ROCURONIUM BROMIDE 30 MG: 10 INJECTION, SOLUTION INTRAVENOUS at 01:07

## 2019-07-19 RX ADMIN — SUGAMMADEX 200 MG: 100 INJECTION, SOLUTION INTRAVENOUS at 01:07

## 2019-07-19 RX ADMIN — CLINDAMYCIN IN 5 PERCENT DEXTROSE 900 MG: 18 INJECTION, SOLUTION INTRAVENOUS at 01:07

## 2019-07-19 RX ADMIN — MIDAZOLAM HYDROCHLORIDE 2 MG: 1 INJECTION, SOLUTION INTRAMUSCULAR; INTRAVENOUS at 01:07

## 2019-07-19 RX ADMIN — GENTAMICIN SULFATE 574.7 MG: 40 INJECTION, SOLUTION INTRAMUSCULAR; INTRAVENOUS at 01:07

## 2019-07-19 NOTE — DISCHARGE INSTRUCTIONS
Etonogestrel implant  What is this medicine?  ETONOGESTREL (et [place] nicholas LORRAINE trel) is a contraceptive (birth control) device. It is used to prevent pregnancy. It can be used for up to 3 years.  How should I use this medicine?  This device is inserted just under the skin on the inner side of your upper arm by a health care professional.  Talk to your pediatrician regarding the use of this medicine in children. Special care may be needed.  What side effects may I notice from receiving this medicine?  Side effects that you should report to your doctor or health care professional as soon as possible:  · allergic reactions like skin rash, itching or hives, swelling of the face, lips, or tongue  · breast lumps  · changes in emotions or moods  · depressed mood  · heavy or prolonged menstrual bleeding  · pain, irritation, swelling, or bruising at the insertion site  · scar at site of insertion  · signs of infection at the insertion site such as fever, and skin redness, pain or discharge  · signs of pregnancy  · signs and symptoms of a blood clot such as breathing problems; changes in vision; chest pain; severe, sudden headache; pain, swelling, warmth in the leg; trouble speaking; sudden numbness or weakness of the face, arm or leg  · signs and symptoms of liver injury like dark yellow or brown urine; general ill feeling or flu-like symptoms; light-colored stools; loss of appetite; nausea; right upper belly pain; unusually weak or tired; yellowing of the eyes or skin  · unusual vaginal bleeding, discharge  · signs and symptoms of a stroke like changes in vision; confusion; trouble speaking or understanding; severe headaches; sudden numbness or weakness of the face, arm or leg; trouble walking; dizziness; loss of balance or coordination  Side effects that usually do not require medical attention (Report these to your doctor or health care professional if they continue or are bothersome.):  · acne  · back pain  · breast  pain  · changes in weight  · dizziness  · general ill feeling or flu-like symptoms  · headache  · irregular menstrual bleeding  · nausea  · sore throat  · vaginal irritation or inflammation  What may interact with this medicine?  Do not take this medicine with any of the following medications:  · amprenavir  · bosentan  · fosamprenavir  This medicine may also interact with the following medications:  · barbiturate medicines for inducing sleep or treating seizures  · certain medicines for fungal infections like ketoconazole and itraconazole  · griseofulvin  · medicines to treat seizures like carbamazepine, felbamate, oxcarbazepine, phenytoin, topiramate  · modafinil  · phenylbutazone  · rifampin  · some medicines to treat HIV infection like atazanavir, indinavir, lopinavir, nelfinavir, tipranavir, ritonavir  · [place]'s wort  What if I miss a dose?  This does not apply.  Where should I keep my medicine?  This drug is given in a hospital or clinic and will not be stored at home.  What should I tell my health care provider before I take this medicine?  They need to know if you have any of these conditions:  · abnormal vaginal bleeding  · blood vessel disease or blood clots  · cancer of the breast, cervix, or liver  · depression  · diabetes  · gallbladder disease  · headaches  · heart disease or recent heart attack  · high blood pressure  · high cholesterol  · kidney disease  · liver disease  · renal disease  · seizures  · tobacco smoker  · an unusual or allergic reaction to etonogestrel, other hormones, anesthetics or antiseptics, medicines, foods, dyes, or preservatives  · pregnant or trying to get pregnant  · breast-feeding  What should I watch for while using this medicine?  This product does not protect you against HIV infection (AIDS) or other sexually transmitted diseases.  You should be able to feel the implant by pressing your fingertips over the skin where it was inserted. Contact your doctor if you cannot  feel the implant, and use a non-hormonal birth control method (such as condoms) until your doctor confirms that the implant is in place. If you feel that the implant may have broken or become bent while in your arm, contact your healthcare provider.  NOTE:This sheet is a summary. It may not cover all possible information. If you have questions about this medicine, talk to your doctor, pharmacist, or health care provider. Copyright© 2017 [employer]        Hysteroscopy    Hysteroscopy is a procedure that is done to see inside your uterus. It can help find the cause of problems in the uterus. This helps your health care provider decide on the best treatment. In some cases, it can be used to perform treatment. Hysteroscopy may be done in your health care provider's office or in the hospital.  Why might I need hysteroscopy?  Hysteroscopy may be done based on the results of other tests. It can help find the cause of problems. These can include:  · Unusually heavy or long menstrual periods  · Bleeding between periods  · Postmenopausal bleeding  · Trouble becoming pregnant (infertility) or carrying a pregnancy to term  · To locate an intrauterine device (IUD)  · To perform sterilization  What are the risks and complications of hysteroscopy?  Problems with the procedure are rare. But all procedures have risks. Risks of hysteroscopy include:  · Infection  · Bleeding  · Tearing of the uterine wall  · Damage to internal organs  · Scarring of the uterus  · Fluid overload  · Problems with anesthesia (the medication that prevents pain during the procedure)  How do I get ready for hysteroscopy?  · Tell your health care provider if you have any health problems. These include diabetes, heart disease, or bleeding problems.  · Tell your health care provider about all the medicines you take. This includes any over-the-counter medications, herbs, or supplements.  · You may be told not to use vaginal creams or medication. And you may  be told not to have sex or douche.  · You may be told not to eat or drink the night before the procedure.  · You may be tested for pregnancy and infection.  · You may be asked to sign a consent form.  · You may be given a pain reliever to take an hour before the procedure. This helps relieve cramping that may occur.  What happens during a hysteroscopy?  · Youll lie on an exam table with your feet in stirrups.  · You may be given general anesthesia or medicines to help you relax or sleep. In some cases, an IV line will be put into a vein in your arm or hand. This line is then used to give fluids and medicines.  · A tool called a speculum is inserted into the vagina to hold it open. A tool called a dilator may be used to widen the cervix.  · Numbing medicine may be applied to the cervix.  · The hysteroscope (a long, thin lighted tube) is inserted through the vagina and into the uterus. It is used to see inside the uterus. Images of the uterus are viewed on a monitor.  · A gas or fluid may be injected into the uterus to expand it.  · Other tools may be put through the hysteroscope. These are used to take tissue samples, remove growths, or place implants for the purpose of sterilization.  What happens after hysteroscopy?  · You may have cramps and bleeding for 24 hours after the procedure. This is normal. Use pads instead of tampons.  · Do not douche or use tampons until your health care provider says its OK.  · Do not use any vaginal medicines until you are told its OK.  · Ask your health care provider when its OK to have sex again.  When should I call my health care provider?  Call your health care provider if you have:  · Heavy bleeding (more than 1 pad an hour for 2 or more hours)  · A fever over 100.4°F (38.0°C)  · Increasing abdominal pain or tenderness  · Foul-smelling discharge   Follow-up care  Schedule a follow-up visit with your health care provider. Based on the results of your test, you may need more  treatment. Be sure to follow instructions and keep your appointments.  Date Last Reviewed: 5/12/2015  © 5460-3344 [employer]. [address] [place] [address], [place] [address]. All rights reserved. This information is not intended as a substitute for professional medical care. Always follow your healthcare professional's instructions.        Discharge Instructions: After Your Surgery  Youve just had surgery. During surgery, you were given medicine called anesthesia to keep you relaxed and free of pain. After surgery, you may have some pain or nausea. This is common. Here are some tips for feeling better and getting well after surgery.     Stay on schedule with your medicine.   Going home  Your healthcare provider will show you how to take care of yourself when you go home. He or she will also answer your questions. Have an adult family member or friend drive you home. For the first 24 hours after your surgery:  · Do not drive or use heavy equipment.  · Do not make important decisions or sign legal papers.  · Do not drink alcohol.  · Have someone stay with you, if needed. He or she can watch for problems and help keep you safe.  Be sure to go to all follow-up visits with your healthcare provider. And rest after your surgery for as long as your healthcare provider tells you to.  Coping with pain  If you have pain after surgery, pain medicine will help you feel better. Take it as told, before pain becomes severe. Also, ask your healthcare provider or pharmacist about other ways to control pain. This might be with heat, ice, or relaxation. And follow any other instructions your surgeon or nurse gives you.  Tips for taking pain medicine  To get the best relief possible, remember these points:  · Pain medicines can upset your stomach. Taking them with a little food may help.  · Most pain relievers taken by mouth need at least 20 to 30 minutes to start to work.  · Taking medicine on a schedule can help you remember  to take it. Try to time your medicine so that you can take it before starting an activity. This might be before you get dressed, go for a walk, or sit down for dinner.  · Constipation is a common side effect of pain medicines. Call your healthcare provider before taking any medicines such as laxatives or stool softeners to help ease constipation. Also ask if you should skip any foods. Drinking lots of fluids and eating foods such as fruits and vegetables that are high in fiber can also help. Remember, do not take laxatives unless your surgeon has prescribed them.  · Drinking alcohol and taking pain medicine can cause dizziness and slow your breathing. It can even be deadly. Do not drink alcohol while taking pain medicine.  · Pain medicine can make you react more slowly to things. Do not drive or run machinery while taking pain medicine.  Your healthcare provider may tell you to take acetaminophen to help ease your pain. Ask him or her how much you are supposed to take each day. Acetaminophen or other pain relievers may interact with your prescription medicines or other over-the-counter (OTC) medicines. Some prescription medicines have acetaminophen and other ingredients. Using both prescription and OTC acetaminophen for pain can cause you to overdose. Read the labels on your OTC medicines with care. This will help you to clearly know the list of ingredients, how much to take, and any warnings. It may also help you not take too much acetaminophen. If you have questions or do not understand the information, ask your pharmacist or healthcare provider to explain it to you before you take the OTC medicine.  Managing nausea  Some people have an upset stomach after surgery. This is often because of anesthesia, pain, or pain medicine, or the stress of surgery. These tips will help you handle nausea and eat healthy foods as you get better. If you were on a special food plan before surgery, ask your healthcare provider if you  should follow it while you get better. These tips may help:  · Do not push yourself to eat. Your body will tell you when to eat and how much.  · Start off with clear liquids and soup. They are easier to digest.  · Next try semi-solid foods, such as mashed potatoes, applesauce, and gelatin, as you feel ready.  · Slowly move to solid foods. Dont eat fatty, rich, or spicy foods at first.  · Do not force yourself to have 3 large meals a day. Instead eat smaller amounts more often.  · Take pain medicines with a small amount of solid food, such as crackers or toast, to avoid nausea.     Call your surgeon if  · You still have pain an hour after taking medicine. The medicine may not be strong enough.  · You feel too sleepy, dizzy, or groggy. The medicine may be too strong.  · You have side effects like nausea, vomiting, or skin changes, such as rash, itching, or hives.       If you have obstructive sleep apnea  You were given anesthesia medicine during surgery to keep you comfortable and free of pain. After surgery, you may have more apnea spells because of this medicine and other medicines you were given. The spells may last longer than usual.   At home:  · Keep using the continuous positive airway pressure (CPAP) device when you sleep. Unless your healthcare provider tells you not to, use it when you sleep, day or night. CPAP is a common device used to treat obstructive sleep apnea.  · Talk with your provider before taking any pain medicine, muscle relaxants, or sedatives. Your provider will tell you about the possible dangers of taking these medicines.  Date Last Reviewed: 12/1/2016  © 9207-8170 [employer]. [address] [place] [address], [place] [address]. All rights reserved. This information is not intended as a substitute for professional medical care. Always follow your healthcare professional's instructions.

## 2019-07-19 NOTE — OP NOTE
[facility] - Periop Services  Operative Note     SUMMARY     Surgery Date: 7/19/2019     Procedure Performed By: Cody Payne MD    Procedure Performed: D & C Hysteroscopy with Myosure   Placement of Nexplanon device    Anesthesia:  General/MAC    Assisted By:  ANKUSH    Pre-op Diagnosis:  Retained products of conception.  Endometritis.  Anemia.  Contraceptive management.    Post-op Diagnosis:  Same     Estimated Blood Loss: *100cc  Complications: none  Specimen:  Retained products of conception       Findings:  Patient sounded to 12 cm.  There was a 3 cm mass along the posterior wall of the uterus near the fundus.  It appeared vascular in nature.  It appeared to be retained products of conception.  There was approximately 100 cc fluid deficit.        Procedure Performed: D & C Hysteroscopy with Myosure with placement of Nexplanon device    Procedure in Detail: After ensuring informed consent, the patient was taken to the operating room where general anesthesia was initiated. A time out was performed with the O.R. crew. She was placed in the adjustable Jarred stirrups. Her perineum was prepped and draped in the usual sterile fashion. The anterior lip of the cervix was grasped with a single- toothed tenaculum. The uterus was sounded to the above stated length. The patient was gently dilated to the highest dilatation with the Hanks dilators. The hysteroscope was then placed inside the patients uterus, and inspection of the patients uterus revealed the above findings. The Myosure device was placed inside the uterine cavity. The endometrial pathology was removed. The hysteroscope was removed. All instruments were removed from the patients vagina. She was taken out of the adjustable Jarred stirrups and placed in the supine position. The patient's left arm was prepped. 1% lidocaine was placed in a subcutaneous block.. the Nexplanon device was placed into the left arm without difficulty. A pressure  dressing was placed on the left arm She was awakened from anesthesia and taken to the recovery room in stable condition.  All counts were correct x 2. The patient tolerated the procedure well. The tissue was sent to the pathology.

## 2019-07-19 NOTE — BRIEF OP NOTE
[facility] - Periop Services  Brief Operative Note     SUMMARY     Surgery Date: 7/19/2019     Surgeon(s) and Role:     * Cody Payne MD - Primary    Assisting Surgeon: None    Pre-op Diagnosis:  Retained products of conception  Endometritis  Contraceptive management    Post-op Diagnosis:  Post-Op Diagnosis Codes:     Retained products of conception .  Endometritis.  Contraceptive management    Procedure(s) (LRB):  HYSTEROSCOPY, WITH DILATION AND CURETTAGE OF UTERUS (N/A)   Placement of transdermal implant for contraception    Anesthesia: General/MAC    Description of the findings of the procedure:  Approximately 2 cm vascular mass located on the posterior wall near the fundus.  The patient sounded to 10 cm.    Findings/Key Components:  As above    Estimated Blood Loss: *100cc       Specimens:   Retained products of conception    Discharge Note    SUMMARY     Admit Date: 7/19/2019    Discharge Date and Time:  07/19/2019 2:02 PM    Hospital Course (synopsis of major diagnoses, care, treatment, and services provided during the course of the hospital stay):  Procedure went well and without incident.  I will send the patient home with doxycycline and Flagyl x2 weeks.      Final Diagnosis: Post-Op Diagnosis Codes:   Retained products of conception.  Endometritis.  Contraceptive management    Disposition: Home or Self Care    Follow Up/Patient Instructions:     Medications:  Reconciled Home Medications:      Medication List      START taking these medications    doxycycline 100 MG capsule  Commonly known as:  MONODOX  Take 1 capsule (100 mg total) by mouth every 12 (twelve) hours. for 14 days     ibuprofen 800 MG tablet  Commonly known as:  ADVIL,MOTRIN  Take 1 tablet (800 mg total) by mouth every 8 (eight) hours as needed for Pain.     metroNIDAZOLE 500 MG tablet  Commonly known as:  FLAGYL  Take 1 tablet (500 mg total) by mouth every 12 (twelve) hours. No alcohol while taking medicine for  14 days     senna-docusate 8.6-50 mg 8.6-50 mg per tablet  Commonly known as:  PERICOLACE  Take 1 tablet by mouth 2 (two) times daily.        CONTINUE taking these medications    docusate sodium 100 MG capsule  Commonly known as:  COLACE  Take 2 capsules (200 mg total) by mouth 2 (two) times daily as needed for Constipation.     HYDROcodone-acetaminophen 7.5-325 mg per tablet  Commonly known as:  NORCO  Take 1 tablet by mouth every 8 (eight) hours as needed.          Discharge Procedure Orders   Diet general     Other restrictions (specify):   Order Comments: Do not have sex, use tampons, or douche     Call MD for:  temperature >100.4     Call MD for:  persistent nausea and vomiting     Call MD for:  severe uncontrolled pain     Call MD for:  difficulty breathing, headache or visual disturbances     Call MD for:  redness, tenderness, or signs of infection (pain, swelling, redness, odor or green/yellow discharge around incision site)     Follow-up Information     Cody Payne MD In 1 week.    Specialty:  Obstetrics  Why:  Pathology review  Contact information:  [address] [place] [address] [phone]

## 2019-07-19 NOTE — TRANSFER OF CARE
"Anesthesia Transfer of Care Note    Patient: Felicitas Santos    Procedure(s) Performed: Procedure(s) (LRB):  HYSTEROSCOPY, WITH DILATION AND CURETTAGE OF UTERUS (N/A)    Patient location: PACU    Anesthesia Type: general    Transport from OR: Transported from OR on room air with adequate spontaneous ventilation    Post pain: adequate analgesia    Post assessment: no apparent anesthetic complications and tolerated procedure well    Post vital signs: stable    Level of consciousness: sedated and responds to stimulation    Nausea/Vomiting: no nausea/vomiting    Complications: none    Transfer of care protocol was followed      Last vitals:   Visit Vitals  /89 (BP Location: Right arm, Patient Position: Sitting)   Pulse 99   Temp 36.6 °C (97.9 °F) (Oral)   Resp 18   Ht 5' 7" (1.702 m)   Wt 68 kg (150 lb)   LMP 12/16/2018   SpO2 100%   Breastfeeding? No   BMI 23.49 kg/m²     "

## 2019-07-19 NOTE — ANESTHESIA PREPROCEDURE EVALUATION
07/19/2019  Felicitas Santos is a 28 y.o., female.    Anesthesia Evaluation    I have reviewed the Patient Summary Reports.    I have reviewed the Nursing Notes.   I have reviewed the Medications.     Review of Systems  Anesthesia Hx:  No problems with previous Anesthesia    Social:  Smoker, Social Alcohol Use    Hematology/Oncology:  Hematology Normal   Oncology Normal     EENT/Dental:EENT/Dental Normal   Cardiovascular:  Cardiovascular Normal     Pulmonary:  Pulmonary Normal    Renal/:  Renal/ Normal     Hepatic/GI:  Hepatic/GI Normal    Musculoskeletal:  Musculoskeletal Normal    OB/GYN/PEDS:  Issues with Current Pregnancy (IUFD)    Neurological:  Neurology Normal    Endocrine:  Endocrine Normal    Dermatological:  Skin Normal    Psych:   Psychiatric History          Physical Exam  General:  Well nourished    Airway/Jaw/Neck:  Airway Findings: Mouth Opening: Normal Tongue: Normal  General Airway Assessment: Adult  Mallampati: I  TM Distance: Normal, at least 6 cm       Chest/Lungs:  Chest/Lungs Findings: Clear to auscultation     Heart/Vascular:  Heart Findings: Rate: Normal  Rhythm: Regular Rhythm        Mental Status:  Mental Status Findings:  Cooperative, Alert and Oriented         Anesthesia Plan  Type of Anesthesia, risks & benefits discussed:  Anesthesia Type:  general  Patient's Preference:   Intra-op Monitoring Plan: standard ASA monitors  Intra-op Monitoring Plan Comments:   Post Op Pain Control Plan: IV/PO Opioids PRN  Post Op Pain Control Plan Comments:   Induction:   IV  Beta Blocker:  Patient is not currently on a Beta-Blocker (No further documentation required).       Informed Consent: Patient understands risks and agrees with Anesthesia plan.  Questions answered. Anesthesia consent signed with patient.  ASA Score: 2  emergent   Day of Surgery Review of History & Physical: I have interviewed  and examined the patient. I have reviewed the patient's H&P dated:            Ready For Surgery From Anesthesia Perspective.

## 2019-07-19 NOTE — ANESTHESIA POSTPROCEDURE EVALUATION
Anesthesia Post Evaluation    Patient: Felicitas Santos    Procedure(s) Performed: Procedure(s) (LRB):  HYSTEROSCOPY, WITH DILATION AND CURETTAGE OF UTERUS (N/A)    Final Anesthesia Type: general  Patient location during evaluation: PACU  Patient participation: Yes- Able to Participate  Level of consciousness: awake and alert  Post-procedure vital signs: reviewed and stable  Pain management: adequate  Airway patency: patent  PONV status at discharge: No PONV  Anesthetic complications: no      Cardiovascular status: blood pressure returned to baseline  Respiratory status: unassisted  Hydration status: euvolemic  Follow-up not needed.          Vitals Value Taken Time   /85 7/19/2019  2:45 PM   Temp 36.6 °C (97.9 °F) 7/19/2019 11:50 AM   Pulse 81 7/19/2019  2:46 PM   Resp 19 7/19/2019  2:46 PM   SpO2 98 % 7/19/2019  2:46 PM   Vitals shown include unvalidated device data.      Event Time     Out of Recovery 14:36:33          Pain/Imani Score: Imani Score: 10 (7/19/2019  2:30 PM)

## 2019-07-19 NOTE — PLAN OF CARE
Discharge teaching complete. All questions answered and necessary handouts provided. Doctor spoke with patient and family.

## 2019-12-18 ENCOUNTER — HOSPITAL ENCOUNTER (EMERGENCY)
Facility: HOSPITAL | Age: 29
Discharge: HOME | End: 2019-12-18
Payer: MEDICAID

## 2019-12-18 VITALS
HEIGHT: 67 IN | RESPIRATION RATE: 16 BRPM | TEMPERATURE: 98 F | BODY MASS INDEX: 20.09 KG/M2 | DIASTOLIC BLOOD PRESSURE: 93 MMHG | SYSTOLIC BLOOD PRESSURE: 129 MMHG | HEART RATE: 90 BPM | WEIGHT: 128 LBS | OXYGEN SATURATION: 99 %

## 2019-12-18 DIAGNOSIS — Z71.1 CONCERN ABOUT STD IN FEMALE WITHOUT DIAGNOSIS: ICD-10-CM

## 2019-12-18 DIAGNOSIS — R10.2 VAGINAL PAIN: Primary | ICD-10-CM

## 2019-12-18 LAB
B-HCG UR QL: NEGATIVE
BACTERIA #/AREA URNS HPF: ABNORMAL /HPF
BACTERIA GENITAL QL WET PREP: ABNORMAL
BILIRUB UR QL STRIP: ABNORMAL
CLARITY UR: ABNORMAL
CLUE CELLS VAG QL WET PREP: ABNORMAL
COLOR UR: YELLOW
FILAMENT FUNGI VAG WET PREP-#/AREA: ABNORMAL
GLUCOSE UR QL STRIP: NEGATIVE
HGB UR QL STRIP: NEGATIVE
HYALINE CASTS #/AREA URNS LPF: 0 /LPF
KETONES UR QL STRIP: NEGATIVE
LEUKOCYTE ESTERASE UR QL STRIP: ABNORMAL
MICROSCOPIC COMMENT: ABNORMAL
NITRITE UR QL STRIP: NEGATIVE
PH UR STRIP: >8 [PH] (ref 5–8)
PROT UR QL STRIP: ABNORMAL
RBC #/AREA URNS HPF: 2 /HPF (ref 0–4)
SP GR UR STRIP: 1.01 (ref 1–1.03)
SPECIMEN SOURCE: ABNORMAL
SQUAMOUS #/AREA URNS HPF: ABNORMAL /HPF
T VAGINALIS GENITAL QL WET PREP: ABNORMAL
URN SPEC COLLECT METH UR: ABNORMAL
UROBILINOGEN UR STRIP-ACNC: 4 EU/DL
WBC #/AREA URNS HPF: 10 /HPF (ref 0–5)
WBC #/AREA VAG WET PREP: ABNORMAL
YEAST GENITAL QL WET PREP: ABNORMAL

## 2019-12-18 PROCEDURE — 81025 URINE PREGNANCY TEST: CPT

## 2019-12-18 PROCEDURE — 87210 SMEAR WET MOUNT SALINE/INK: CPT

## 2019-12-18 PROCEDURE — 86780 TREPONEMA PALLIDUM: CPT

## 2019-12-18 PROCEDURE — 87529 HSV DNA AMP PROBE: CPT

## 2019-12-18 PROCEDURE — 81000 URINALYSIS NONAUTO W/SCOPE: CPT

## 2019-12-18 PROCEDURE — 36415 COLL VENOUS BLD VENIPUNCTURE: CPT

## 2019-12-18 PROCEDURE — 99284 EMERGENCY DEPT VISIT MOD MDM: CPT

## 2019-12-18 PROCEDURE — 87491 CHLMYD TRACH DNA AMP PROBE: CPT

## 2019-12-18 PROCEDURE — 86703 HIV-1/HIV-2 1 RESULT ANTBDY: CPT

## 2019-12-18 RX ORDER — CIPROFLOXACIN 500 MG/1
500 TABLET ORAL 2 TIMES DAILY
Qty: 14 TABLET | Refills: 0 | Status: SHIPPED | OUTPATIENT
Start: 2019-12-18 | End: 2019-12-25

## 2019-12-18 RX ORDER — ACYCLOVIR 400 MG/1
400 TABLET ORAL 3 TIMES DAILY
Qty: 30 TABLET | Refills: 0 | Status: ON HOLD | OUTPATIENT
Start: 2019-12-18 | End: 2023-11-05 | Stop reason: HOSPADM

## 2019-12-18 RX ORDER — TRAMADOL HYDROCHLORIDE 50 MG/1
50 TABLET ORAL EVERY 6 HOURS PRN
Qty: 12 TABLET | Refills: 0 | Status: SHIPPED | OUTPATIENT
Start: 2019-12-18 | End: 2019-12-21

## 2019-12-18 NOTE — ED TRIAGE NOTES
"" my vaginal hurts" " there are like ulcers on it" symptoms since " Friday" reports vaginal discharge, " it's not really a color" rates pain to labia 10/10  "

## 2019-12-19 LAB
C TRACH DNA SPEC QL NAA+PROBE: NOT DETECTED
HIV 1+2 AB+HIV1 P24 AG SERPL QL IA: NEGATIVE
N GONORRHOEA DNA SPEC QL NAA+PROBE: NOT DETECTED

## 2019-12-19 NOTE — ED NOTES
Instructed to provide a little bit more urine for the STD testing in urine, urine provided before was not enough, pt verbalized understanding

## 2019-12-19 NOTE — ED PROVIDER NOTES
Encounter Date: 12/18/2019       History     Chief Complaint   Patient presents with    Vaginal Pain     Felicitas Soliz is a 29 y.o female with PMHx including asthma and bipolar. She presents to ED with complaint of vaginal pain since Friday    Paitnet with intense, painful ulceration labia minoria. She reports pain 10/10.    No abdominal, pelvic or flank pain    She reports burning with urination. No urgency or frequency.    No vaginal discharge or bleeding.    No new sexual partners.  No concerns for STDs.    No fever,chills, chest pain, dyspnea, weakness, vomiting, hematuria        Review of patient's allergies indicates:   Allergen Reactions    Grass pollen-orchardgrass, standard Rash     Past Medical History:   Diagnosis Date    Asthma     Iron deficiency anemia due to chronic blood loss 7/19/2019    Iron deficiency anemia due to chronic blood loss 7/19/2019    Mental disorder     Bipolar     Postpartum endometritis 7/19/2019     Past Surgical History:   Procedure Laterality Date    HYSTEROSCOPY WITH DILATION AND CURETTAGE OF UTERUS N/A 7/19/2019    Procedure: HYSTEROSCOPY, WITH DILATION AND CURETTAGE OF UTERUS;  Surgeon: Cody Payne MD;  Location: [facility] OR;  Service: OB/GYN;  Laterality: N/A;  Insertion of transdermal device     Family History   Problem Relation Age of Onset    Hypertension Father      Social History     Tobacco Use    Smoking status: Current Every Day Smoker     Packs/day: 0.50    Smokeless tobacco: Never Used   Substance Use Topics    Alcohol use: Not Currently    Drug use: Never     Review of Systems   Constitutional: Negative.    HENT: Negative.    Eyes: Negative.    Respiratory: Negative.    Cardiovascular: Negative.    Gastrointestinal: Negative.    Endocrine: Negative.    Genitourinary: Positive for dysuria and genital sores. Negative for decreased urine volume, difficulty urinating, dyspareunia, enuresis, flank pain, frequency, hematuria, menstrual problem, pelvic  pain, urgency, vaginal bleeding, vaginal discharge and vaginal pain.   Musculoskeletal: Negative.    Allergic/Immunologic: Negative.    Neurological: Negative.    Hematological: Negative.    Psychiatric/Behavioral: Negative.        Physical Exam     Initial Vitals [12/18/19 1705]   BP Pulse Resp Temp SpO2   (!) 129/93 90 16 98.1 °F (36.7 °C) 99 %      MAP       --         Physical Exam    Nursing note and vitals reviewed.  Constitutional: She appears well-developed and well-nourished.   HENT:   Head: Normocephalic.   Eyes: Pupils are equal, round, and reactive to light.   Neck: Normal range of motion.   Cardiovascular: Normal rate, regular rhythm and normal heart sounds.   Pulmonary/Chest: Breath sounds normal.   Genitourinary:       There is tenderness and lesion on the left labia. Cervix exhibits discharge and friability. Cervix exhibits no motion tenderness. Right adnexum displays no mass, no tenderness and no fullness. Left adnexum displays no mass, no tenderness and no fullness. There is tenderness in the vagina.   Musculoskeletal: Normal range of motion.   Neurological: She is alert and oriented to person, place, and time.   Skin: Skin is warm and dry.   Psychiatric: She has a normal mood and affect. Her behavior is normal. Judgment and thought content normal.         ED Course   Procedures  Labs Reviewed   URINALYSIS, REFLEX TO URINE CULTURE - Abnormal; Notable for the following components:       Result Value    Appearance, UA Cloudy (*)     pH, UA >8.0 (*)     Protein, UA 1+ (*)     Bilirubin (UA) 1+ (*)     Leukocytes, UA 1+ (*)     All other components within normal limits    Narrative:     Preferred Collection Type->Urine, Clean Catch   URINALYSIS MICROSCOPIC - Abnormal; Notable for the following components:    WBC, UA 10 (*)     Bacteria Few (*)     All other components within normal limits    Narrative:     Preferred Collection Type->Urine, Clean Catch   VAGINAL SCREEN - Abnormal; Notable for the  following components:    WBC - Vaginal Screen Occasional (*)     Bacteria - Vaginal Screen Few (*)     All other components within normal limits   C. TRACHOMATIS/N. GONORRHOEAE BY AMP DNA   PREGNANCY TEST, URINE RAPID   HIV-1 DNA AND RNA QUAL BY PCR   HERPES SIMPLEX VIRUS (HSV) TYPE 1 & 2 DNA BY PCR   TREPONEMA PALLIDUM (SYPHILLIS) ANTIBODY          Imaging Results    None          Medical Decision Making:   Initial Assessment:   Patient with complaint of vaginal pain since Friday    Paitnet with intense, painful ulceration labia minoria. She reports pain 10/10.    No abdominal, pelvic or flank pain    She reports burning with urination. No urgency or frequency.    No vaginal discharge or bleeding.    No new sexual partners.  No concerns for STDs.    No fever,chills, chest pain, dyspnea, weakness, vomiting, hematuria    Differential Diagnosis:   STD, UTI,     Candida, PID, BV    Ulcer, chancroid    Trauma, cancer, skin reaction  ED Management:  UA with 10 WBC    Symptom high suspicious for herpes    STD/HIV pending    Discussed physical exam findings with patient  No acute emergent medical condition identified at this time to warrant further testing/diagnostics  At this time, I believe the patient is clinically stable for discharge.   Patient to follow up with GYN in 1-2 days.  The patient acknowledges that close follow up with a MD is required after all ER visits  Pt given instructions; take all medications prescribed in the ER as directed.   Patient agrees to comply with all instruction and direction given in the ER  Pt agrees to return to ER if any symptoms reoccur                                              Clinical Impression:       ICD-10-CM ICD-9-CM   1. Vaginal pain R10.2 625.9   2. Concern about STD in female without diagnosis Z71.1 V65.5                             Haily Rojo NP  12/18/19 6591

## 2019-12-19 NOTE — DISCHARGE INSTRUCTIONS
Take medication as prescribed    STD test pending    Refrain from sexual activity until results available

## 2019-12-20 LAB
HSV-1 DNA BY PCR: NEGATIVE
HSV-2 DNA BY PCR: NEGATIVE
TREPONEMA PALLIDUM IGG+IGM AB [PRESENCE] IN SERUM OR PLASMA BY IMMUNOASSAY: NONREACTIVE

## 2020-07-07 ENCOUNTER — HOSPITAL ENCOUNTER (EMERGENCY)
Facility: HOSPITAL | Age: 30
Discharge: HOME | End: 2020-07-07
Attending: EMERGENCY MEDICINE

## 2020-07-07 VITALS
SYSTOLIC BLOOD PRESSURE: 128 MMHG | OXYGEN SATURATION: 99 % | WEIGHT: 127.88 LBS | DIASTOLIC BLOOD PRESSURE: 84 MMHG | RESPIRATION RATE: 18 BRPM | TEMPERATURE: 98 F | HEART RATE: 119 BPM | HEIGHT: 68 IN | BODY MASS INDEX: 19.38 KG/M2

## 2020-07-07 DIAGNOSIS — K08.89 PAIN, DENTAL: Primary | ICD-10-CM

## 2020-07-07 PROCEDURE — 99283 EMERGENCY DEPT VISIT LOW MDM: CPT

## 2020-07-07 RX ORDER — CLINDAMYCIN HYDROCHLORIDE 150 MG/1
300 CAPSULE ORAL 4 TIMES DAILY
Qty: 56 CAPSULE | Refills: 0 | Status: SHIPPED | OUTPATIENT
Start: 2020-07-07 | End: 2020-07-14

## 2020-07-07 NOTE — ED PROVIDER NOTES
Encounter Date: 7/7/2020       History     Chief Complaint   Patient presents with    Dental Pain     Patient presents to the ER with complaint of right lower dental pain.  Patient states has been going on for the last few days.  Patient states has had this problem in the past and is requesting antibiotics.  Patient states is not taking any over the counter pain medication.  Patient denies any fever nausea vomiting diarrhea.  Patient denied having followed up with a dentist and denies other associated symptoms.    The history is provided by the patient.     Review of patient's allergies indicates:   Allergen Reactions    Grass pollen-orchardgrass, standard Rash     Past Medical History:   Diagnosis Date    Asthma     Iron deficiency anemia due to chronic blood loss 7/19/2019    Iron deficiency anemia due to chronic blood loss 7/19/2019    Mental disorder     Bipolar     Postpartum endometritis 7/19/2019     Past Surgical History:   Procedure Laterality Date    HYSTEROSCOPY WITH DILATION AND CURETTAGE OF UTERUS N/A 7/19/2019    Procedure: HYSTEROSCOPY, WITH DILATION AND CURETTAGE OF UTERUS;  Surgeon: Cody Payne MD;  Location: [facility] OR;  Service: OB/GYN;  Laterality: N/A;  Insertion of transdermal device     Family History   Problem Relation Age of Onset    Hypertension Father      Social History     Tobacco Use    Smoking status: Current Every Day Smoker     Packs/day: 0.50    Smokeless tobacco: Never Used   Substance Use Topics    Alcohol use: Not Currently    Drug use: Never     Review of Systems   Constitutional: Negative for chills, diaphoresis and fever.   HENT: Negative for congestion, ear discharge, ear pain, rhinorrhea, sinus pain and sore throat.         Dental pain right lower   Respiratory: Negative for shortness of breath.    Cardiovascular: Negative for chest pain.   Gastrointestinal: Negative for abdominal pain, constipation, diarrhea, nausea and vomiting.   Genitourinary: Negative  for difficulty urinating.   Musculoskeletal: Negative for back pain.   Skin: Negative for rash.   Neurological: Negative for weakness.   Hematological: Does not bruise/bleed easily.   All other systems reviewed and are negative.      Physical Exam     Initial Vitals [07/07/20 1725]   BP Pulse Resp Temp SpO2   128/84 (!) 119 18 98.2 °F (36.8 °C) 99 %      MAP       --         Physical Exam    Nursing note and vitals reviewed.  Constitutional: She appears well-developed and well-nourished. She is not diaphoretic. No distress.   HENT:   Head: Atraumatic.   Right Ear: Tympanic membrane, external ear and ear canal normal.   Left Ear: Tympanic membrane, external ear and ear canal normal.   Nose: Nose normal. No mucosal edema, rhinorrhea, nose lacerations, sinus tenderness, nasal deformity, septal deviation or nasal septal hematoma. No epistaxis.  No foreign bodies. Right sinus exhibits no maxillary sinus tenderness and no frontal sinus tenderness. Left sinus exhibits no maxillary sinus tenderness and no frontal sinus tenderness.   Mouth/Throat: Uvula is midline, oropharynx is clear and moist and mucous membranes are normal. No oropharyngeal exudate.   Multiple severe necrotic teeth.   Eyes: Conjunctivae are normal. Right eye exhibits no discharge. Left eye exhibits no discharge.   Neck: Normal range of motion. Neck supple.   Cardiovascular: Normal rate, regular rhythm and intact distal pulses.   Pulmonary/Chest: No respiratory distress.   Musculoskeletal: Normal range of motion.   Neurological: She is alert and oriented to person, place, and time. GCS score is 15. GCS eye subscore is 4. GCS verbal subscore is 5. GCS motor subscore is 6.   Skin: Skin is warm and dry. Capillary refill takes less than 2 seconds.   Psychiatric: She has a normal mood and affect. Thought content normal.         ED Course   Procedures  Labs Reviewed - No data to display       Imaging Results    None          Medical Decision Making:    Differential Diagnosis:   Dental pain, dental abscess, dental cristopher  ED Management:  Discussed plan of care the patient as well as return to ER precautions discussed use of antibiotics over-the-counter Tylenol ibuprofen as needed for aches or pains.  Discussed need for follow-up with dentist at next available appointment patient verbalizes she understood she did not have any questions.    This note was created using [employer] voice recognition software that occasionally misinterpreted phrases or words.                                 Clinical Impression:       ICD-10-CM ICD-9-CM   1. Pain, dental  K08.89 525.9             ED Disposition Condition    Discharge Stable        ED Prescriptions     Medication Sig Dispense Start Date End Date Auth. Provider    clindamycin (CLEOCIN) 150 MG capsule Take 2 capsules (300 mg total) by mouth 4 (four) times daily. for 7 days 56 capsule 7/7/2020 7/14/2020 ARIC Drew        Follow-up Information     Follow up With Specialties Details Why Contact Info    [facility] - ED Emergency Medicine  If symptoms worsen [address] [facility] [address] [phone]                                     ARIC Drew  07/07/20 4800

## 2020-07-07 NOTE — DISCHARGE INSTRUCTIONS
Follow-up with dentist at next available appointment.    Be sure to take all antibiotics as prescribed do not leave any left in the bottle.    If acute worsening of symptoms return to ER for re-evaluation.

## 2021-01-03 ENCOUNTER — HOSPITAL ENCOUNTER (EMERGENCY)
Facility: HOSPITAL | Age: 31
Discharge: HOME | End: 2021-01-03
Attending: EMERGENCY MEDICINE
Payer: COMMERCIAL

## 2021-01-03 VITALS
HEIGHT: 67 IN | WEIGHT: 118 LBS | OXYGEN SATURATION: 99 % | SYSTOLIC BLOOD PRESSURE: 107 MMHG | RESPIRATION RATE: 19 BRPM | HEART RATE: 103 BPM | DIASTOLIC BLOOD PRESSURE: 73 MMHG | BODY MASS INDEX: 18.52 KG/M2 | TEMPERATURE: 98 F

## 2021-01-03 DIAGNOSIS — K04.7 PERIAPICAL ABSCESS: Primary | ICD-10-CM

## 2021-01-03 DIAGNOSIS — K04.7 DENTAL INFECTION: ICD-10-CM

## 2021-01-03 DIAGNOSIS — K08.89 PAIN, DENTAL: ICD-10-CM

## 2021-01-03 PROCEDURE — 99284 EMERGENCY DEPT VISIT MOD MDM: CPT

## 2021-01-03 RX ORDER — PENICILLIN V POTASSIUM 250 MG/1
500 TABLET, FILM COATED ORAL
Status: DISCONTINUED | OUTPATIENT
Start: 2021-01-03 | End: 2021-01-03

## 2021-01-03 RX ORDER — HYDROCODONE BITARTRATE AND ACETAMINOPHEN 5; 325 MG/1; MG/1
1 TABLET ORAL EVERY 8 HOURS PRN
Qty: 6 TABLET | Refills: 0 | Status: SHIPPED | OUTPATIENT
Start: 2021-01-03 | End: 2021-01-03 | Stop reason: CLARIF

## 2021-01-03 RX ORDER — CHLORHEXIDINE GLUCONATE ORAL RINSE 1.2 MG/ML
15 SOLUTION DENTAL 2 TIMES DAILY
Qty: 420 ML | Refills: 0 | Status: SHIPPED | OUTPATIENT
Start: 2021-01-03 | End: 2021-01-17

## 2021-01-03 RX ORDER — CLOVE OIL
OIL (ML) MISCELLANEOUS
Qty: 25 ML | Refills: 0 | Status: SHIPPED | OUTPATIENT
Start: 2021-01-03 | End: 2021-01-10

## 2021-01-03 RX ORDER — NAPROXEN 500 MG/1
500 TABLET ORAL 2 TIMES DAILY WITH MEALS
Qty: 10 TABLET | Refills: 0 | Status: SHIPPED | OUTPATIENT
Start: 2021-01-03 | End: 2021-01-03 | Stop reason: CLARIF

## 2021-01-03 RX ORDER — PENICILLIN V POTASSIUM 500 MG/1
500 TABLET, FILM COATED ORAL EVERY 8 HOURS
Qty: 21 TABLET | Refills: 0 | Status: SHIPPED | OUTPATIENT
Start: 2021-01-03 | End: 2021-01-10

## 2021-03-04 NOTE — PLAN OF CARE
Patient awake and alert. VSS. No complaints of pain or discomfort. Tolerating liquids without any N/V.   (3) no apparent problem

## 2021-08-15 ENCOUNTER — LAB VISIT (OUTPATIENT)
Dept: FAMILY MEDICINE | Facility: CLINIC | Age: 31
End: 2021-08-15
Payer: COMMERCIAL

## 2021-08-15 DIAGNOSIS — R05.9 COUGH: ICD-10-CM

## 2021-08-15 DIAGNOSIS — R51.9 HEAD ACHE: ICD-10-CM

## 2021-08-15 PROCEDURE — U0005 INFEC AGEN DETEC AMPLI PROBE: HCPCS | Performed by: FAMILY MEDICINE

## 2021-08-15 PROCEDURE — U0003 INFECTIOUS AGENT DETECTION BY NUCLEIC ACID (DNA OR RNA); SEVERE ACUTE RESPIRATORY SYNDROME CORONAVIRUS 2 (SARS-COV-2) (CORONAVIRUS DISEASE [COVID-19]), AMPLIFIED PROBE TECHNIQUE, MAKING USE OF HIGH THROUGHPUT TECHNOLOGIES AS DESCRIBED BY CMS-2020-01-R: HCPCS | Performed by: FAMILY MEDICINE

## 2021-08-16 LAB
SARS-COV-2 RNA RESP QL NAA+PROBE: DETECTED
SARS-COV-2- CYCLE NUMBER: 22.34

## 2022-01-06 ENCOUNTER — ANESTHESIA (OUTPATIENT)
Dept: SURGERY | Facility: HOSPITAL | Age: 32
End: 2022-01-06
Payer: COMMERCIAL

## 2022-01-06 ENCOUNTER — ANESTHESIA EVENT (OUTPATIENT)
Dept: SURGERY | Facility: HOSPITAL | Age: 32
End: 2022-01-06
Payer: COMMERCIAL

## 2022-01-06 ENCOUNTER — HOSPITAL ENCOUNTER (OUTPATIENT)
Facility: HOSPITAL | Age: 32
Discharge: HOME | End: 2022-01-07
Attending: EMERGENCY MEDICINE | Admitting: HOSPITALIST
Payer: COMMERCIAL

## 2022-01-06 DIAGNOSIS — K35.209 ACUTE APPENDICITIS WITH GENERALIZED PERITONITIS, WITHOUT GANGRENE OR ABSCESS, UNSPECIFIED WHETHER PERFORATION PRESENT: Primary | ICD-10-CM

## 2022-01-06 DIAGNOSIS — K37 APPENDICITIS, UNSPECIFIED APPENDICITIS TYPE: ICD-10-CM

## 2022-01-06 LAB
ALBUMIN SERPL BCP-MCNC: 4.2 G/DL (ref 3.5–5.2)
ALP SERPL-CCNC: 63 U/L (ref 55–135)
ALT SERPL W/O P-5'-P-CCNC: 10 U/L (ref 10–44)
ANION GAP SERPL CALC-SCNC: 16 MMOL/L (ref 8–16)
AST SERPL-CCNC: 15 U/L (ref 10–40)
B-HCG UR QL: NEGATIVE
BACTERIA #/AREA URNS HPF: ABNORMAL /HPF
BASOPHILS # BLD AUTO: 0.04 K/UL (ref 0–0.2)
BASOPHILS NFR BLD: 0.2 % (ref 0–1.9)
BILIRUB SERPL-MCNC: 1 MG/DL (ref 0.1–1)
BILIRUB UR QL STRIP: ABNORMAL
BUN SERPL-MCNC: 9 MG/DL (ref 6–20)
CALCIUM SERPL-MCNC: 9.2 MG/DL (ref 8.7–10.5)
CHLORIDE SERPL-SCNC: 101 MMOL/L (ref 95–110)
CLARITY UR: CLEAR
CO2 SERPL-SCNC: 19 MMOL/L (ref 23–29)
COLOR UR: ABNORMAL
CREAT SERPL-MCNC: 0.7 MG/DL (ref 0.5–1.4)
DIFFERENTIAL METHOD: ABNORMAL
EOSINOPHIL # BLD AUTO: 0 K/UL (ref 0–0.5)
EOSINOPHIL NFR BLD: 0 % (ref 0–8)
ERYTHROCYTE [DISTWIDTH] IN BLOOD BY AUTOMATED COUNT: 13 % (ref 11.5–14.5)
EST. GFR  (AFRICAN AMERICAN): >60 ML/MIN/1.73 M^2
EST. GFR  (NON AFRICAN AMERICAN): >60 ML/MIN/1.73 M^2
GLUCOSE SERPL-MCNC: 102 MG/DL (ref 70–110)
GLUCOSE UR QL STRIP: NEGATIVE
HCT VFR BLD AUTO: 40.8 % (ref 37–48.5)
HGB BLD-MCNC: 14.2 G/DL (ref 12–16)
HGB UR QL STRIP: ABNORMAL
HYALINE CASTS #/AREA URNS LPF: 0 /LPF
IMM GRANULOCYTES # BLD AUTO: 0.3 K/UL (ref 0–0.04)
IMM GRANULOCYTES NFR BLD AUTO: 1.4 % (ref 0–0.5)
KETONES UR QL STRIP: NEGATIVE
LACTATE SERPL-SCNC: 1.2 MMOL/L (ref 0.5–2.2)
LEUKOCYTE ESTERASE UR QL STRIP: ABNORMAL
LYMPHOCYTES # BLD AUTO: 0.9 K/UL (ref 1–4.8)
LYMPHOCYTES NFR BLD: 4.2 % (ref 18–48)
MCH RBC QN AUTO: 31.6 PG (ref 27–31)
MCHC RBC AUTO-ENTMCNC: 34.8 G/DL (ref 32–36)
MCV RBC AUTO: 91 FL (ref 82–98)
MICROSCOPIC COMMENT: ABNORMAL
MONOCYTES # BLD AUTO: 0.4 K/UL (ref 0.3–1)
MONOCYTES NFR BLD: 1.8 % (ref 4–15)
NEUTROPHILS # BLD AUTO: 20.4 K/UL (ref 1.8–7.7)
NEUTROPHILS NFR BLD: 92.4 % (ref 38–73)
NITRITE UR QL STRIP: NEGATIVE
NRBC BLD-RTO: 0 /100 WBC
PH UR STRIP: 7 [PH] (ref 5–8)
PLATELET # BLD AUTO: 193 K/UL (ref 150–450)
PMV BLD AUTO: 10 FL (ref 9.2–12.9)
POTASSIUM SERPL-SCNC: 3.7 MMOL/L (ref 3.5–5.1)
PROT SERPL-MCNC: 7.1 G/DL (ref 6–8.4)
PROT UR QL STRIP: ABNORMAL
RBC # BLD AUTO: 4.5 M/UL (ref 4–5.4)
RBC #/AREA URNS HPF: 15 /HPF (ref 0–4)
SARS-COV-2 RDRP RESP QL NAA+PROBE: NEGATIVE
SODIUM SERPL-SCNC: 136 MMOL/L (ref 136–145)
SP GR UR STRIP: 1.02 (ref 1–1.03)
URN SPEC COLLECT METH UR: ABNORMAL
UROBILINOGEN UR STRIP-ACNC: 1 EU/DL
WBC # BLD AUTO: 22.03 K/UL (ref 3.9–12.7)
WBC #/AREA URNS HPF: 50 /HPF (ref 0–5)

## 2022-01-06 PROCEDURE — 63600175 PHARM REV CODE 636 W HCPCS: Performed by: NURSE ANESTHETIST, CERTIFIED REGISTERED

## 2022-01-06 PROCEDURE — 63600175 PHARM REV CODE 636 W HCPCS: Performed by: EMERGENCY MEDICINE

## 2022-01-06 PROCEDURE — D9220A PRA ANESTHESIA: Mod: ANES,,, | Performed by: ANESTHESIOLOGY

## 2022-01-06 PROCEDURE — 99284 EMERGENCY DEPT VISIT MOD MDM: CPT | Mod: 57,,, | Performed by: STUDENT IN AN ORGANIZED HEALTH CARE EDUCATION/TRAINING PROGRAM

## 2022-01-06 PROCEDURE — 71000033 HC RECOVERY, INTIAL HOUR: Performed by: STUDENT IN AN ORGANIZED HEALTH CARE EDUCATION/TRAINING PROGRAM

## 2022-01-06 PROCEDURE — 25000003 PHARM REV CODE 250: Performed by: STUDENT IN AN ORGANIZED HEALTH CARE EDUCATION/TRAINING PROGRAM

## 2022-01-06 PROCEDURE — 96375 TX/PRO/DX INJ NEW DRUG ADDON: CPT

## 2022-01-06 PROCEDURE — 96365 THER/PROPH/DIAG IV INF INIT: CPT

## 2022-01-06 PROCEDURE — U0002 COVID-19 LAB TEST NON-CDC: HCPCS | Performed by: EMERGENCY MEDICINE

## 2022-01-06 PROCEDURE — 63600175 PHARM REV CODE 636 W HCPCS: Performed by: ANESTHESIOLOGY

## 2022-01-06 PROCEDURE — 88304 TISSUE EXAM BY PATHOLOGIST: CPT | Mod: 26,,, | Performed by: PATHOLOGY

## 2022-01-06 PROCEDURE — D9220A PRA ANESTHESIA: ICD-10-PCS | Mod: CRNA,,, | Performed by: NURSE ANESTHETIST, CERTIFIED REGISTERED

## 2022-01-06 PROCEDURE — 36000709 HC OR TIME LEV III EA ADD 15 MIN: Performed by: STUDENT IN AN ORGANIZED HEALTH CARE EDUCATION/TRAINING PROGRAM

## 2022-01-06 PROCEDURE — D9220A PRA ANESTHESIA: Mod: CRNA,,, | Performed by: NURSE ANESTHETIST, CERTIFIED REGISTERED

## 2022-01-06 PROCEDURE — 25000003 PHARM REV CODE 250: Performed by: NURSE ANESTHETIST, CERTIFIED REGISTERED

## 2022-01-06 PROCEDURE — 44970 PR LAP,APPENDECTOMY: ICD-10-PCS | Mod: ,,, | Performed by: STUDENT IN AN ORGANIZED HEALTH CARE EDUCATION/TRAINING PROGRAM

## 2022-01-06 PROCEDURE — 36000708 HC OR TIME LEV III 1ST 15 MIN: Performed by: STUDENT IN AN ORGANIZED HEALTH CARE EDUCATION/TRAINING PROGRAM

## 2022-01-06 PROCEDURE — 37000009 HC ANESTHESIA EA ADD 15 MINS: Performed by: STUDENT IN AN ORGANIZED HEALTH CARE EDUCATION/TRAINING PROGRAM

## 2022-01-06 PROCEDURE — 87086 URINE CULTURE/COLONY COUNT: CPT | Performed by: EMERGENCY MEDICINE

## 2022-01-06 PROCEDURE — 85025 COMPLETE CBC W/AUTO DIFF WBC: CPT | Performed by: EMERGENCY MEDICINE

## 2022-01-06 PROCEDURE — 63600175 PHARM REV CODE 636 W HCPCS: Performed by: STUDENT IN AN ORGANIZED HEALTH CARE EDUCATION/TRAINING PROGRAM

## 2022-01-06 PROCEDURE — 74177 CT ABD & PELVIS W/CONTRAST: CPT | Mod: 26,,, | Performed by: RADIOLOGY

## 2022-01-06 PROCEDURE — 74177 CT ABD & PELVIS W/CONTRAST: CPT | Mod: TC

## 2022-01-06 PROCEDURE — 83605 ASSAY OF LACTIC ACID: CPT | Performed by: EMERGENCY MEDICINE

## 2022-01-06 PROCEDURE — D9220A PRA ANESTHESIA: ICD-10-PCS | Mod: ANES,,, | Performed by: ANESTHESIOLOGY

## 2022-01-06 PROCEDURE — 99284 PR EMERGENCY DEPT VISIT,LEVEL IV: ICD-10-PCS | Mod: 57,,, | Performed by: STUDENT IN AN ORGANIZED HEALTH CARE EDUCATION/TRAINING PROGRAM

## 2022-01-06 PROCEDURE — 27201423 OPTIME MED/SURG SUP & DEVICES STERILE SUPPLY: Performed by: STUDENT IN AN ORGANIZED HEALTH CARE EDUCATION/TRAINING PROGRAM

## 2022-01-06 PROCEDURE — 88304 PR  SURG PATH,LEVEL III: ICD-10-PCS | Mod: 26,,, | Performed by: PATHOLOGY

## 2022-01-06 PROCEDURE — 74177 CT ABDOMEN PELVIS WITH CONTRAST: ICD-10-PCS | Mod: 26,,, | Performed by: RADIOLOGY

## 2022-01-06 PROCEDURE — 88304 TISSUE EXAM BY PATHOLOGIST: CPT | Performed by: PATHOLOGY

## 2022-01-06 PROCEDURE — 81025 URINE PREGNANCY TEST: CPT | Performed by: EMERGENCY MEDICINE

## 2022-01-06 PROCEDURE — 80053 COMPREHEN METABOLIC PANEL: CPT | Performed by: EMERGENCY MEDICINE

## 2022-01-06 PROCEDURE — 99285 EMERGENCY DEPT VISIT HI MDM: CPT | Mod: 25

## 2022-01-06 PROCEDURE — 37000008 HC ANESTHESIA 1ST 15 MINUTES: Performed by: STUDENT IN AN ORGANIZED HEALTH CARE EDUCATION/TRAINING PROGRAM

## 2022-01-06 PROCEDURE — 81000 URINALYSIS NONAUTO W/SCOPE: CPT | Performed by: EMERGENCY MEDICINE

## 2022-01-06 PROCEDURE — 44970 LAPAROSCOPY APPENDECTOMY: CPT | Mod: ,,, | Performed by: STUDENT IN AN ORGANIZED HEALTH CARE EDUCATION/TRAINING PROGRAM

## 2022-01-06 PROCEDURE — 25500020 PHARM REV CODE 255: Performed by: EMERGENCY MEDICINE

## 2022-01-06 PROCEDURE — 25000003 PHARM REV CODE 250: Performed by: EMERGENCY MEDICINE

## 2022-01-06 RX ORDER — SODIUM CHLORIDE, SODIUM LACTATE, POTASSIUM CHLORIDE, CALCIUM CHLORIDE 600; 310; 30; 20 MG/100ML; MG/100ML; MG/100ML; MG/100ML
INJECTION, SOLUTION INTRAVENOUS CONTINUOUS
Status: DISCONTINUED | OUTPATIENT
Start: 2022-01-06 | End: 2022-01-06

## 2022-01-06 RX ORDER — SODIUM CHLORIDE, SODIUM LACTATE, POTASSIUM CHLORIDE, CALCIUM CHLORIDE 600; 310; 30; 20 MG/100ML; MG/100ML; MG/100ML; MG/100ML
INJECTION, SOLUTION INTRAVENOUS CONTINUOUS PRN
Status: DISCONTINUED | OUTPATIENT
Start: 2022-01-06 | End: 2022-01-06

## 2022-01-06 RX ORDER — KETOROLAC TROMETHAMINE 30 MG/ML
15 INJECTION, SOLUTION INTRAMUSCULAR; INTRAVENOUS EVERY 6 HOURS
Status: DISCONTINUED | OUTPATIENT
Start: 2022-01-07 | End: 2022-01-07 | Stop reason: HOSPADM

## 2022-01-06 RX ORDER — HYDROMORPHONE HYDROCHLORIDE 2 MG/ML
INJECTION, SOLUTION INTRAMUSCULAR; INTRAVENOUS; SUBCUTANEOUS
Status: DISCONTINUED | OUTPATIENT
Start: 2022-01-06 | End: 2022-01-06

## 2022-01-06 RX ORDER — ONDANSETRON 2 MG/ML
4 INJECTION INTRAMUSCULAR; INTRAVENOUS EVERY 6 HOURS PRN
Status: DISCONTINUED | OUTPATIENT
Start: 2022-01-06 | End: 2022-01-07 | Stop reason: HOSPADM

## 2022-01-06 RX ORDER — IBUPROFEN 200 MG
24 TABLET ORAL
Status: DISCONTINUED | OUTPATIENT
Start: 2022-01-06 | End: 2022-01-07 | Stop reason: HOSPADM

## 2022-01-06 RX ORDER — SODIUM CHLORIDE, SODIUM LACTATE, POTASSIUM CHLORIDE, CALCIUM CHLORIDE 600; 310; 30; 20 MG/100ML; MG/100ML; MG/100ML; MG/100ML
INJECTION, SOLUTION INTRAVENOUS CONTINUOUS
Status: DISCONTINUED | OUTPATIENT
Start: 2022-01-06 | End: 2022-01-07 | Stop reason: HOSPADM

## 2022-01-06 RX ORDER — ACETAMINOPHEN 325 MG/1
650 TABLET ORAL EVERY 4 HOURS PRN
Status: DISCONTINUED | OUTPATIENT
Start: 2022-01-06 | End: 2022-01-07 | Stop reason: HOSPADM

## 2022-01-06 RX ORDER — PROPOFOL 10 MG/ML
VIAL (ML) INTRAVENOUS
Status: DISCONTINUED | OUTPATIENT
Start: 2022-01-06 | End: 2022-01-06

## 2022-01-06 RX ORDER — MORPHINE SULFATE 4 MG/ML
4 INJECTION, SOLUTION INTRAMUSCULAR; INTRAVENOUS
Status: DISCONTINUED | OUTPATIENT
Start: 2022-01-06 | End: 2022-01-07 | Stop reason: HOSPADM

## 2022-01-06 RX ORDER — GLUCAGON 1 MG
1 KIT INJECTION
Status: DISCONTINUED | OUTPATIENT
Start: 2022-01-06 | End: 2022-01-07 | Stop reason: HOSPADM

## 2022-01-06 RX ORDER — ONDANSETRON 2 MG/ML
4 INJECTION INTRAMUSCULAR; INTRAVENOUS DAILY PRN
Status: DISCONTINUED | OUTPATIENT
Start: 2022-01-06 | End: 2022-01-06

## 2022-01-06 RX ORDER — ROCURONIUM BROMIDE 10 MG/ML
INJECTION, SOLUTION INTRAVENOUS
Status: DISCONTINUED | OUTPATIENT
Start: 2022-01-06 | End: 2022-01-06

## 2022-01-06 RX ORDER — DEXAMETHASONE SODIUM PHOSPHATE 4 MG/ML
INJECTION, SOLUTION INTRA-ARTICULAR; INTRALESIONAL; INTRAMUSCULAR; INTRAVENOUS; SOFT TISSUE
Status: DISCONTINUED | OUTPATIENT
Start: 2022-01-06 | End: 2022-01-06

## 2022-01-06 RX ORDER — HEPARIN SODIUM 5000 [USP'U]/ML
5000 INJECTION, SOLUTION INTRAVENOUS; SUBCUTANEOUS EVERY 8 HOURS
Status: DISCONTINUED | OUTPATIENT
Start: 2022-01-06 | End: 2022-01-06

## 2022-01-06 RX ORDER — LIDOCAINE HYDROCHLORIDE 10 MG/ML
1 INJECTION, SOLUTION EPIDURAL; INFILTRATION; INTRACAUDAL; PERINEURAL ONCE
Status: DISCONTINUED | OUTPATIENT
Start: 2022-01-06 | End: 2022-01-06

## 2022-01-06 RX ORDER — ENOXAPARIN SODIUM 100 MG/ML
40 INJECTION SUBCUTANEOUS EVERY 24 HOURS
Status: DISCONTINUED | OUTPATIENT
Start: 2022-01-07 | End: 2022-01-07 | Stop reason: HOSPADM

## 2022-01-06 RX ORDER — ONDANSETRON 2 MG/ML
4 INJECTION INTRAMUSCULAR; INTRAVENOUS EVERY 6 HOURS PRN
Status: DISCONTINUED | OUTPATIENT
Start: 2022-01-06 | End: 2022-01-06

## 2022-01-06 RX ORDER — MORPHINE SULFATE 4 MG/ML
2 INJECTION, SOLUTION INTRAMUSCULAR; INTRAVENOUS EVERY 5 MIN PRN
Status: DISCONTINUED | OUTPATIENT
Start: 2022-01-06 | End: 2022-01-06

## 2022-01-06 RX ORDER — IPRATROPIUM BROMIDE AND ALBUTEROL SULFATE 2.5; .5 MG/3ML; MG/3ML
3 SOLUTION RESPIRATORY (INHALATION) EVERY 4 HOURS PRN
Status: DISCONTINUED | OUTPATIENT
Start: 2022-01-06 | End: 2022-01-07 | Stop reason: HOSPADM

## 2022-01-06 RX ORDER — ONDANSETRON 2 MG/ML
4 INJECTION INTRAMUSCULAR; INTRAVENOUS
Status: COMPLETED | OUTPATIENT
Start: 2022-01-06 | End: 2022-01-06

## 2022-01-06 RX ORDER — OXYCODONE AND ACETAMINOPHEN 5; 325 MG/1; MG/1
1 TABLET ORAL
Status: DISCONTINUED | OUTPATIENT
Start: 2022-01-06 | End: 2022-01-06

## 2022-01-06 RX ORDER — MIDAZOLAM HYDROCHLORIDE 1 MG/ML
INJECTION INTRAMUSCULAR; INTRAVENOUS
Status: DISCONTINUED | OUTPATIENT
Start: 2022-01-06 | End: 2022-01-06

## 2022-01-06 RX ORDER — KETOROLAC TROMETHAMINE 30 MG/ML
15 INJECTION, SOLUTION INTRAMUSCULAR; INTRAVENOUS ONCE
Status: COMPLETED | OUTPATIENT
Start: 2022-01-06 | End: 2022-01-06

## 2022-01-06 RX ORDER — MORPHINE SULFATE 4 MG/ML
2 INJECTION, SOLUTION INTRAMUSCULAR; INTRAVENOUS
Status: DISCONTINUED | OUTPATIENT
Start: 2022-01-06 | End: 2022-01-07

## 2022-01-06 RX ORDER — LIDOCAINE HYDROCHLORIDE 10 MG/ML
1 INJECTION, SOLUTION EPIDURAL; INFILTRATION; INTRACAUDAL; PERINEURAL ONCE AS NEEDED
Status: DISCONTINUED | OUTPATIENT
Start: 2022-01-06 | End: 2022-01-06

## 2022-01-06 RX ORDER — SUCCINYLCHOLINE CHLORIDE 20 MG/ML
INJECTION INTRAMUSCULAR; INTRAVENOUS
Status: DISCONTINUED | OUTPATIENT
Start: 2022-01-06 | End: 2022-01-06

## 2022-01-06 RX ORDER — SODIUM CHLORIDE 0.9 % (FLUSH) 0.9 %
10 SYRINGE (ML) INJECTION EVERY 12 HOURS PRN
Status: DISCONTINUED | OUTPATIENT
Start: 2022-01-06 | End: 2022-01-07 | Stop reason: HOSPADM

## 2022-01-06 RX ORDER — SODIUM CHLORIDE, SODIUM LACTATE, POTASSIUM CHLORIDE, CALCIUM CHLORIDE 600; 310; 30; 20 MG/100ML; MG/100ML; MG/100ML; MG/100ML
125 INJECTION, SOLUTION INTRAVENOUS CONTINUOUS
Status: DISCONTINUED | OUTPATIENT
Start: 2022-01-06 | End: 2022-01-06

## 2022-01-06 RX ORDER — NALOXONE HCL 0.4 MG/ML
0.02 VIAL (ML) INJECTION
Status: DISCONTINUED | OUTPATIENT
Start: 2022-01-06 | End: 2022-01-07 | Stop reason: HOSPADM

## 2022-01-06 RX ORDER — BUPIVACAINE HYDROCHLORIDE 5 MG/ML
INJECTION, SOLUTION EPIDURAL; INTRACAUDAL
Status: DISCONTINUED | OUTPATIENT
Start: 2022-01-06 | End: 2022-01-06 | Stop reason: HOSPADM

## 2022-01-06 RX ORDER — HYDROMORPHONE HYDROCHLORIDE 2 MG/ML
0.5 INJECTION, SOLUTION INTRAMUSCULAR; INTRAVENOUS; SUBCUTANEOUS EVERY 4 HOURS PRN
Status: DISCONTINUED | OUTPATIENT
Start: 2022-01-06 | End: 2022-01-06

## 2022-01-06 RX ORDER — ONDANSETRON 2 MG/ML
INJECTION INTRAMUSCULAR; INTRAVENOUS
Status: DISCONTINUED | OUTPATIENT
Start: 2022-01-06 | End: 2022-01-06

## 2022-01-06 RX ORDER — IBUPROFEN 200 MG
16 TABLET ORAL
Status: DISCONTINUED | OUTPATIENT
Start: 2022-01-06 | End: 2022-01-07 | Stop reason: HOSPADM

## 2022-01-06 RX ORDER — PHENYLEPHRINE HYDROCHLORIDE 10 MG/ML
INJECTION INTRAVENOUS
Status: DISCONTINUED | OUTPATIENT
Start: 2022-01-06 | End: 2022-01-06

## 2022-01-06 RX ORDER — LIDOCAINE HYDROCHLORIDE 20 MG/ML
INJECTION, SOLUTION EPIDURAL; INFILTRATION; INTRACAUDAL; PERINEURAL
Status: DISCONTINUED | OUTPATIENT
Start: 2022-01-06 | End: 2022-01-06

## 2022-01-06 RX ORDER — EPHEDRINE SULFATE 50 MG/ML
INJECTION, SOLUTION INTRAVENOUS
Status: DISCONTINUED | OUTPATIENT
Start: 2022-01-06 | End: 2022-01-06

## 2022-01-06 RX ORDER — HYDROMORPHONE HYDROCHLORIDE 2 MG/ML
0.5 INJECTION, SOLUTION INTRAMUSCULAR; INTRAVENOUS; SUBCUTANEOUS
Status: COMPLETED | OUTPATIENT
Start: 2022-01-06 | End: 2022-01-06

## 2022-01-06 RX ORDER — MEPERIDINE HYDROCHLORIDE 50 MG/ML
INJECTION INTRAMUSCULAR; INTRAVENOUS; SUBCUTANEOUS
Status: DISCONTINUED | OUTPATIENT
Start: 2022-01-06 | End: 2022-01-06

## 2022-01-06 RX ADMIN — HYDROMORPHONE HYDROCHLORIDE 1 MG: 2 INJECTION INTRAMUSCULAR; INTRAVENOUS; SUBCUTANEOUS at 09:01

## 2022-01-06 RX ADMIN — SODIUM CHLORIDE, POTASSIUM CHLORIDE, SODIUM LACTATE AND CALCIUM CHLORIDE: 600; 310; 30; 20 INJECTION, SOLUTION INTRAVENOUS at 08:01

## 2022-01-06 RX ADMIN — PROPOFOL 200 MG: 10 INJECTION, EMULSION INTRAVENOUS at 08:01

## 2022-01-06 RX ADMIN — PHENYLEPHRINE HYDROCHLORIDE 100 MCG: 10 INJECTION INTRAVENOUS at 09:01

## 2022-01-06 RX ADMIN — EPHEDRINE SULFATE 25 MG: 50 INJECTION INTRAVENOUS at 09:01

## 2022-01-06 RX ADMIN — ROCURONIUM BROMIDE 25 MG: 10 INJECTION, SOLUTION INTRAVENOUS at 08:01

## 2022-01-06 RX ADMIN — MIDAZOLAM HYDROCHLORIDE 2 MG: 1 INJECTION, SOLUTION INTRAMUSCULAR; INTRAVENOUS at 08:01

## 2022-01-06 RX ADMIN — SUGAMMADEX 200 MG: 100 INJECTION, SOLUTION INTRAVENOUS at 09:01

## 2022-01-06 RX ADMIN — IOHEXOL 75 ML: 350 INJECTION, SOLUTION INTRAVENOUS at 06:01

## 2022-01-06 RX ADMIN — PHENYLEPHRINE HYDROCHLORIDE 200 MCG: 10 INJECTION INTRAVENOUS at 09:01

## 2022-01-06 RX ADMIN — LIDOCAINE HYDROCHLORIDE 100 MG: 20 INJECTION, SOLUTION EPIDURAL; INFILTRATION; INTRACAUDAL; PERINEURAL at 08:01

## 2022-01-06 RX ADMIN — SODIUM CHLORIDE, SODIUM LACTATE, POTASSIUM CHLORIDE, AND CALCIUM CHLORIDE: .6; .31; .03; .02 INJECTION, SOLUTION INTRAVENOUS at 11:01

## 2022-01-06 RX ADMIN — HYDROMORPHONE HYDROCHLORIDE 0.5 MG: 2 INJECTION, SOLUTION INTRAMUSCULAR; INTRAVENOUS; SUBCUTANEOUS at 06:01

## 2022-01-06 RX ADMIN — ERTAPENEM SODIUM 1 G: 1 INJECTION, POWDER, LYOPHILIZED, FOR SOLUTION INTRAMUSCULAR; INTRAVENOUS at 07:01

## 2022-01-06 RX ADMIN — ONDANSETRON 4 MG: 2 INJECTION INTRAMUSCULAR; INTRAVENOUS at 08:01

## 2022-01-06 RX ADMIN — DEXAMETHASONE SODIUM PHOSPHATE 12 MG: 4 INJECTION, SOLUTION INTRAMUSCULAR; INTRAVENOUS at 09:01

## 2022-01-06 RX ADMIN — MEPERIDINE HYDROCHLORIDE 50 MG: 50 INJECTION INTRAMUSCULAR; INTRAVENOUS; SUBCUTANEOUS at 08:01

## 2022-01-06 RX ADMIN — ONDANSETRON 4 MG: 2 INJECTION INTRAMUSCULAR; INTRAVENOUS at 06:01

## 2022-01-06 RX ADMIN — KETOROLAC TROMETHAMINE 15 MG: 30 INJECTION, SOLUTION INTRAMUSCULAR; INTRAVENOUS at 10:01

## 2022-01-06 RX ADMIN — SODIUM CHLORIDE, SODIUM LACTATE, POTASSIUM CHLORIDE, AND CALCIUM CHLORIDE 1000 ML: .6; .31; .03; .02 INJECTION, SOLUTION INTRAVENOUS at 07:01

## 2022-01-06 RX ADMIN — SUCCINYLCHOLINE CHLORIDE 150 MG: 20 INJECTION, SOLUTION INTRAMUSCULAR; INTRAVENOUS at 08:01

## 2022-01-06 NOTE — LETTER
January 7, 2022         [address] [facility] [address]  Phone: [phone]  Fax: [phone]       Patient: Felicitas Santos   YOB: 1990  Date of Visit: 01/07/2022    To Whom It May Concern:    Lucas Santos  was admitted at [facility] on 01/06/2022. The patient may not return to work until follow up with Dr Askew, her surgeon on Jan 21, 2022. At that time her doctor will determine when she will be able to return to work. If you have any questions or concerns, or if I can be of further assistance, please do not hesitate to contact me. [phone].    Sincerely,              Sobia Glass RN

## 2022-01-06 NOTE — ED PROVIDER NOTES
Encounter Date: 1/6/2022       History     Chief Complaint   Patient presents with    Abdominal Pain     Patient complain of abdominal pain and cramping denies nausea vomiting      Patient is a 31-year-old female here for evaluation and treatment of generalized abdominal pain which she states began last night while she was at work.  States the pain has gradually worsened since then.  She denies any constipation or loose stools.  Denies any dysuria or hematuria.  She states she is currently having her menstrual period.  Denies fever or chills.  No cough or shortness of breath.  Patient denies any history of abdominal surgeries.  No history of appendicitis, cholecystitis, colitis, etc..  She states that she took some Midol last night while at work but this did not help her symptoms.        Review of patient's allergies indicates:   Allergen Reactions    Grass pollen-orchardgrass, standard Rash     Past Medical History:   Diagnosis Date    Asthma     Iron deficiency anemia due to chronic blood loss 7/19/2019    Iron deficiency anemia due to chronic blood loss 7/19/2019    Mental disorder     Bipolar     Postpartum endometritis 7/19/2019     Past Surgical History:   Procedure Laterality Date    HYSTEROSCOPY WITH DILATION AND CURETTAGE OF UTERUS N/A 7/19/2019    Procedure: HYSTEROSCOPY, WITH DILATION AND CURETTAGE OF UTERUS;  Surgeon: Cody Payne MD;  Location: [facility] OR;  Service: OB/GYN;  Laterality: N/A;  Insertion of transdermal device     Family History   Problem Relation Age of Onset    Hypertension Father      Social History     Tobacco Use    Smoking status: Current Every Day Smoker     Packs/day: 0.50    Smokeless tobacco: Never Used   Substance Use Topics    Alcohol use: Not Currently     Comment: rarely    Drug use: Never     Review of Systems   Constitutional: Positive for appetite change and chills. Negative for fever.   HENT: Negative for congestion, drooling, rhinorrhea and sore throat.     Eyes: Negative for photophobia, pain and redness.   Respiratory: Negative for cough, chest tightness, shortness of breath and wheezing.    Cardiovascular: Negative for chest pain and palpitations.   Gastrointestinal: Positive for abdominal pain. Negative for abdominal distention, blood in stool, constipation, diarrhea, nausea and vomiting.   Endocrine: Negative for polydipsia and polyuria.   Genitourinary: Positive for vaginal bleeding (current menses). Negative for decreased urine volume, difficulty urinating, dysuria, enuresis, flank pain, frequency, hematuria and pelvic pain.   Musculoskeletal: Negative for back pain, myalgias, neck pain and neck stiffness.   Skin: Negative for pallor and rash.   Neurological: Negative for dizziness, syncope, weakness, numbness and headaches.   Psychiatric/Behavioral: The patient is not nervous/anxious.        Physical Exam     Initial Vitals [01/06/22 1600]   BP Pulse Resp Temp SpO2   109/66 (!) 113 20 98.4 °F (36.9 °C) 98 %      MAP       --         Physical Exam    Nursing note and vitals reviewed.  Constitutional: She appears well-developed and well-nourished. She appears distressed (Mild distress secondary to discomfort).   HENT:   Head: Normocephalic and atraumatic.   Nose: Nose normal.   Mouth/Throat: Oropharynx is clear and moist. No oropharyngeal exudate.   Eyes: Conjunctivae and EOM are normal. Pupils are equal, round, and reactive to light.   Neck: Neck supple. No JVD present.   Normal range of motion.  Cardiovascular: Regular rhythm, normal heart sounds and intact distal pulses.   No murmur heard.  Tachycardic   Pulmonary/Chest: Breath sounds normal. No stridor. No respiratory distress. She has no wheezes.   Abdominal: Abdomen is soft. Bowel sounds are normal. She exhibits no distension. There is abdominal tenderness.   The abdomen is exquisitely tender to palpation in all quadrants.  When asked to pinpoint the pain, the patient points to an area between the  umbilicus and the symphysis pubis There is rebound and guarding.   Musculoskeletal:         General: No tenderness or edema. Normal range of motion.      Cervical back: Normal range of motion and neck supple.     Neurological: She is alert and oriented to person, place, and time. She has normal strength and normal reflexes. No cranial nerve deficit or sensory deficit. GCS score is 15. GCS eye subscore is 4. GCS verbal subscore is 5. GCS motor subscore is 6.   Skin: Skin is warm and dry. Capillary refill takes less than 2 seconds. No rash noted. No erythema.   Psychiatric: She has a normal mood and affect. Her behavior is normal.         ED Course   Procedures  Labs Reviewed   CBC W/ AUTO DIFFERENTIAL - Abnormal; Notable for the following components:       Result Value    WBC 22.03 (*)     MCH 31.6 (*)     Immature Granulocytes 1.4 (*)     Gran # (ANC) 20.4 (*)     Immature Grans (Abs) 0.30 (*)     Lymph # 0.9 (*)     Gran % 92.4 (*)     Lymph % 4.2 (*)     Mono % 1.8 (*)     All other components within normal limits   COMPREHENSIVE METABOLIC PANEL - Abnormal; Notable for the following components:    CO2 19 (*)     All other components within normal limits   URINALYSIS, REFLEX TO URINE CULTURE - Abnormal; Notable for the following components:    Color, UA Orange (*)     Protein, UA 1+ (*)     Bilirubin (UA) 1+ (*)     Occult Blood UA 2+ (*)     Leukocytes, UA 1+ (*)     All other components within normal limits    Narrative:     Preferred Collection Type->Urine, Clean Catch  Specimen Source->Urine   URINALYSIS MICROSCOPIC - Abnormal; Notable for the following components:    RBC, UA 15 (*)     WBC, UA 50 (*)     Bacteria Few (*)     All other components within normal limits    Narrative:     Preferred Collection Type->Urine, Clean Catch  Specimen Source->Urine   CULTURE, URINE   LACTIC ACID, PLASMA   PREGNANCY TEST, URINE RAPID    Narrative:     Specimen Source->Urine   SARS-COV-2 RNA AMPLIFICATION, QUAL           Imaging Results          CT Abdomen Pelvis With Contrast (In process)  Result time 01/06/22 18:41:50                 Medications   ertapenem (INVANZ) 1 g in sodium chloride 0.9 % 100 mL IVPB (ready to mix system) (has no administration in time range)   lactated ringers bolus 1,000 mL (has no administration in time range)   HYDROmorphone (PF) injection 0.5 mg (0.5 mg Intravenous Given 1/6/22 1828)   ondansetron injection 4 mg (4 mg Intravenous Given 1/6/22 1828)   iohexoL (OMNIPAQUE 350) injection 75 mL (75 mLs Intravenous Given 1/6/22 1857)     Medical Decision Making:   Differential Diagnosis:   Appendicitis, colitis, diverticulitis, constipation, ureteral calculus, UTI, ovarian cyst, ovarian torsion, etc.  ED Management:  Patient's white blood cell count is elevated at [address]. Her abdomen is very tender.  CT of the abdomen and pelvis, personally reviewed by me, showed a 7 mm fluid-filled appendix with periappendiceal fat stranding.  I spoke to Dr. Askew, general surgeon on-call, who will take patient to the OR [facility] for appendectomy.  I explained the plan of care to the patient and she is in agreement.  Dr. Askew would like for the patient to be admitted to the hospitalist service.  Dr.Charit Pate will be contacted.                        Clinical Impression:   Final diagnoses:  [K35.20] Acute appendicitis with generalized peritonitis, without gangrene or abscess, unspecified whether perforation present (Primary)          ED Disposition Condition    Admit               Abelino Arguello MD  01/10/22 2023

## 2022-01-07 VITALS
SYSTOLIC BLOOD PRESSURE: 101 MMHG | WEIGHT: 154.81 LBS | DIASTOLIC BLOOD PRESSURE: 51 MMHG | RESPIRATION RATE: 17 BRPM | OXYGEN SATURATION: 98 % | TEMPERATURE: 98 F | HEIGHT: 67 IN | HEART RATE: 103 BPM | BODY MASS INDEX: 24.3 KG/M2

## 2022-01-07 PROBLEM — K35.209 ACUTE APPENDICITIS WITH GENERALIZED PERITONITIS, WITHOUT GANGRENE OR ABSCESS: Status: RESOLVED | Noted: 2022-01-06 | Resolved: 2022-01-07

## 2022-01-07 LAB
ALBUMIN SERPL BCP-MCNC: 3 G/DL (ref 3.5–5.2)
ALP SERPL-CCNC: 79 U/L (ref 55–135)
ALT SERPL W/O P-5'-P-CCNC: 13 U/L (ref 10–44)
ANION GAP SERPL CALC-SCNC: 13 MMOL/L (ref 8–16)
AST SERPL-CCNC: 19 U/L (ref 10–40)
BASOPHILS # BLD AUTO: 0.04 K/UL (ref 0–0.2)
BASOPHILS NFR BLD: 0.2 % (ref 0–1.9)
BILIRUB SERPL-MCNC: 0.3 MG/DL (ref 0.1–1)
BUN SERPL-MCNC: 14 MG/DL (ref 6–20)
CALCIUM SERPL-MCNC: 8.5 MG/DL (ref 8.7–10.5)
CHLORIDE SERPL-SCNC: 104 MMOL/L (ref 95–110)
CO2 SERPL-SCNC: 21 MMOL/L (ref 23–29)
CREAT SERPL-MCNC: 0.6 MG/DL (ref 0.5–1.4)
DIFFERENTIAL METHOD: ABNORMAL
EOSINOPHIL # BLD AUTO: 0.2 K/UL (ref 0–0.5)
EOSINOPHIL NFR BLD: 0.8 % (ref 0–8)
ERYTHROCYTE [DISTWIDTH] IN BLOOD BY AUTOMATED COUNT: 13 % (ref 11.5–14.5)
EST. GFR  (AFRICAN AMERICAN): >60 ML/MIN/1.73 M^2
EST. GFR  (NON AFRICAN AMERICAN): >60 ML/MIN/1.73 M^2
GLUCOSE SERPL-MCNC: 103 MG/DL (ref 70–110)
HCT VFR BLD AUTO: 33.9 % (ref 37–48.5)
HGB BLD-MCNC: 11.8 G/DL (ref 12–16)
IMM GRANULOCYTES # BLD AUTO: 0.39 K/UL (ref 0–0.04)
IMM GRANULOCYTES NFR BLD AUTO: 2 % (ref 0–0.5)
LYMPHOCYTES # BLD AUTO: 2.2 K/UL (ref 1–4.8)
LYMPHOCYTES NFR BLD: 10.9 % (ref 18–48)
MAGNESIUM SERPL-MCNC: 1.6 MG/DL (ref 1.6–2.6)
MCH RBC QN AUTO: 31.5 PG (ref 27–31)
MCHC RBC AUTO-ENTMCNC: 34.8 G/DL (ref 32–36)
MCV RBC AUTO: 90 FL (ref 82–98)
MONOCYTES # BLD AUTO: 0.6 K/UL (ref 0.3–1)
MONOCYTES NFR BLD: 2.8 % (ref 4–15)
NEUTROPHILS # BLD AUTO: 16.6 K/UL (ref 1.8–7.7)
NEUTROPHILS NFR BLD: 83.3 % (ref 38–73)
NRBC BLD-RTO: 0 /100 WBC
PHOSPHATE SERPL-MCNC: 3.3 MG/DL (ref 2.7–4.5)
PLATELET # BLD AUTO: 176 K/UL (ref 150–450)
PMV BLD AUTO: 11 FL (ref 9.2–12.9)
POTASSIUM SERPL-SCNC: 3.8 MMOL/L (ref 3.5–5.1)
PROT SERPL-MCNC: 5.7 G/DL (ref 6–8.4)
RBC # BLD AUTO: 3.75 M/UL (ref 4–5.4)
SODIUM SERPL-SCNC: 138 MMOL/L (ref 136–145)
WBC # BLD AUTO: 19.91 K/UL (ref 3.9–12.7)

## 2022-01-07 PROCEDURE — 83735 ASSAY OF MAGNESIUM: CPT | Performed by: HOSPITALIST

## 2022-01-07 PROCEDURE — 63600175 PHARM REV CODE 636 W HCPCS: Performed by: STUDENT IN AN ORGANIZED HEALTH CARE EDUCATION/TRAINING PROGRAM

## 2022-01-07 PROCEDURE — 25000003 PHARM REV CODE 250: Performed by: HOSPITALIST

## 2022-01-07 PROCEDURE — 84100 ASSAY OF PHOSPHORUS: CPT | Performed by: HOSPITALIST

## 2022-01-07 PROCEDURE — 85025 COMPLETE CBC W/AUTO DIFF WBC: CPT | Performed by: HOSPITALIST

## 2022-01-07 PROCEDURE — 36415 COLL VENOUS BLD VENIPUNCTURE: CPT | Performed by: HOSPITALIST

## 2022-01-07 PROCEDURE — 80053 COMPREHEN METABOLIC PANEL: CPT | Performed by: HOSPITALIST

## 2022-01-07 RX ORDER — HYDROCODONE BITARTRATE AND ACETAMINOPHEN 5; 325 MG/1; MG/1
1 TABLET ORAL EVERY 6 HOURS PRN
Qty: 18 TABLET | Refills: 0 | Status: SHIPPED | OUTPATIENT
Start: 2022-01-07 | End: 2023-11-03

## 2022-01-07 RX ORDER — METRONIDAZOLE 500 MG/1
500 TABLET ORAL EVERY 8 HOURS
Qty: 21 TABLET | Refills: 0 | Status: SHIPPED | OUTPATIENT
Start: 2022-01-07 | End: 2023-11-03

## 2022-01-07 RX ORDER — HYDROCODONE BITARTRATE AND ACETAMINOPHEN 5; 325 MG/1; MG/1
1 TABLET ORAL EVERY 6 HOURS PRN
Status: DISCONTINUED | OUTPATIENT
Start: 2022-01-07 | End: 2022-01-07 | Stop reason: HOSPADM

## 2022-01-07 RX ORDER — CIPROFLOXACIN 500 MG/1
500 TABLET ORAL EVERY 12 HOURS
Qty: 14 TABLET | Refills: 0 | Status: SHIPPED | OUTPATIENT
Start: 2022-01-07 | End: 2023-11-03

## 2022-01-07 RX ADMIN — HYDROCODONE BITARTRATE AND ACETAMINOPHEN 1 TABLET: 5; 325 TABLET ORAL at 11:01

## 2022-01-07 RX ADMIN — MORPHINE SULFATE 2 MG: 4 INJECTION, SOLUTION INTRAMUSCULAR; INTRAVENOUS at 08:01

## 2022-01-07 RX ADMIN — KETOROLAC TROMETHAMINE 15 MG: 30 INJECTION, SOLUTION INTRAMUSCULAR; INTRAVENOUS at 03:01

## 2022-01-07 NOTE — H&P
[facility] Emergency Dept  General Surgery  Consult Note    Patient Name: Felicitas Santos  MRN: [id number]  Admission Date: 1/6/2022  Attending Physician: Abelino Arguello MD   Consult Physician: Maria Guadalupe Askew MD  Primary Care Provider: Primary Doctor No    Patient information was obtained from patient and ER records.     Subjective:     Reason for consultation: appendicitis    History of Present Illness:  Felicitas Santos is a 31 y.o. female with a history of asthma, bipolar disorder, iron deficiency anemia presents with acute appendicitis.  Patient reports sudden onset abdominal pain last night.  She tried taking over-the-counter analgesics with no improvement.  The pain has worsened over the day and so she sought medical care.  The pain is constant.  She describes the pain as cramping in nature.  She has never had pain like this before.  She denies any nausea vomiting.  No change in bowel habits.  Denies fevers or chills.  On arrival to the emergency department the patient was found to have a leukocytosis of 22. CT scan shows a dilated fluid-filled appendix consistent with acute appendicitis.  No prior abdominal surgeries.    Review of patient's allergies indicates:   Allergen Reactions    Grass pollen-orchardgrass, standard Rash       Past Medical History:   Diagnosis Date    Asthma     Iron deficiency anemia due to chronic blood loss 7/19/2019    Iron deficiency anemia due to chronic blood loss 7/19/2019    Mental disorder     Bipolar     Postpartum endometritis 7/19/2019     Past Surgical History:   Procedure Laterality Date    HYSTEROSCOPY WITH DILATION AND CURETTAGE OF UTERUS N/A 7/19/2019    Procedure: HYSTEROSCOPY, WITH DILATION AND CURETTAGE OF UTERUS;  Surgeon: Cody Payne MD;  Location: [facility];  Service: OB/GYN;  Laterality: N/A;  Insertion of transdermal device     Family History     Problem Relation (Age of Onset)    Hypertension Father        Tobacco Use    Smoking status: Current Every Day  Smoker     Packs/day: 0.50    Smokeless tobacco: Never Used   Substance and Sexual Activity    Alcohol use: Not Currently     Comment: rarely    Drug use: Never    Sexual activity: Yes     Partners: Male     Review of Systems   Constitutional: Positive for activity change and appetite change. Negative for chills and fever.   HENT: Negative for congestion, dental problem and ear discharge.    Eyes: Negative for discharge and itching.   Respiratory: Negative for apnea, choking and chest tightness.    Cardiovascular: Negative for chest pain and leg swelling.   Gastrointestinal: Positive for abdominal distention and abdominal pain. Negative for anal bleeding, constipation, diarrhea and nausea.   Endocrine: Negative for cold intolerance and heat intolerance.   Genitourinary: Negative for difficulty urinating and dyspareunia.   Musculoskeletal: Negative for arthralgias and back pain.   Skin: Negative for color change and pallor.   Neurological: Negative for dizziness and facial asymmetry.   Hematological: Negative for adenopathy. Does not bruise/bleed easily.   Psychiatric/Behavioral: Negative for agitation and behavioral problems.     Objective:     Vital Signs (Most Recent):  Temp: 98 °F (36.7 °C) (01/06/22 2014)  Pulse: 98 (01/06/22 2014)  Resp: 20 (01/06/22 2014)  BP: (!) 95/58 (01/06/22 2014)  SpO2: 99 % (01/06/22 2014) Vital Signs (24h Range):  Temp:  [98 °F (36.7 °C)-98.4 °F (36.9 °C)] 98 °F (36.7 °C)  Pulse:  [] 98  Resp:  [20] 20  SpO2:  [98 %-99 %] 99 %  BP: ()/(58-66) 95/58     Weight: 54.4 kg (120 lb)  Body mass index is 18.79 kg/m².    Physical Exam  Vitals reviewed.   Constitutional:       General: She is not in acute distress.     Appearance: She is well-developed and well-nourished.   HENT:      Head: Normocephalic and atraumatic.   Eyes:      Extraocular Movements: EOM normal.      Pupils: Pupils are equal, round, and reactive to light.   Neck:      Thyroid: No thyromegaly.    Cardiovascular:      Rate and Rhythm: Normal rate and regular rhythm.   Pulmonary:      Effort: Pulmonary effort is normal.      Breath sounds: Normal breath sounds.   Abdominal:      General: Bowel sounds are normal. There is no distension.      Palpations: Abdomen is soft. There is no mass.      Tenderness: There is abdominal tenderness. There is no rebound.      Comments: Tender to palpation across lower abdomen, more so in the right lower quadrant   Musculoskeletal:         General: No deformity or edema. Normal range of motion.      Cervical back: Normal range of motion and neck supple.   Skin:     General: Skin is warm.      Capillary Refill: Capillary refill takes less than 2 seconds.      Findings: No erythema.   Neurological:      Mental Status: She is alert and oriented to person, place, and time.      Cranial Nerves: No cranial nerve deficit.   Psychiatric:         Mood and Affect: Mood and affect normal.         Behavior: Behavior normal.         Significant Labs:  CBC:   Recent Labs   Lab 01/06/22  1803   WBC 22.03*   RBC 4.50   HGB 14.2   HCT 40.8      MCV 91   MCH 31.6*   MCHC 34.8     BMP:   Recent Labs   Lab 01/06/22  1803         K 3.7      CO2 19*   BUN 9   CREATININE 0.7   CALCIUM 9.2     CMP:   Recent Labs   Lab 01/06/22  1803      CALCIUM 9.2   ALBUMIN 4.2   PROT 7.1      K 3.7   CO2 19*      BUN 9   CREATININE 0.7   ALKPHOS 63   ALT 10   AST 15   BILITOT 1.0     LFTs:   Recent Labs   Lab 01/06/22  1803   ALT 10   AST 15   ALKPHOS 63   BILITOT 1.0   PROT 7.1   ALBUMIN 4.2     Coagulation: No results for input(s): LABPROT, INR, APTT in the last 168 hours.  Specimen (24h ago, onward)            None        Recent Labs   Lab 01/06/22  1749   COLORU Orange*   SPECGRAV 1.020   PHUR 7.0   PROTEINUA 1+*   BACTERIA Few*   NITRITE Negative   LEUKOCYTESUR 1+*   UROBILINOGEN 1.0   HYALINECASTS 0       Significant Diagnostics:  I have reviewed and interpreted  all pertinent imaging results/findings within the past 24 hours.    Assessment:   Felicitas Santos is a 31 y.o. female who presents with acute appendicitis.    There are no hospital problems to display for this patient.    VTE Risk Mitigation (From admission, onward)    None          Medical Decision Making/Plan:  Antibiotics and IV fluids started in the emergency department.  To OR urgently for laparoscopic versus open appendectomy.  Risks, benefits, alternatives to procedure discussed with patient in detail.  All of her questions were answered.  She agrees to proceed.    Maria Guadalupe Askew MD  General Surgery  [facility] - Emergency Dept

## 2022-01-07 NOTE — PLAN OF CARE
01/07/22 0900   Discharge Assessment   Assessment Type Discharge Planning Assessment   SW attempted to visit with patient to complete discharge planning assessment.  Patient sound asleep.  Per her nurse, she was just given morphine.  SW did make surgery follow up appointment for patient and will try again to speak with patient prior to her discharge.

## 2022-01-07 NOTE — SUBJECTIVE & OBJECTIVE
Past Medical History:   Diagnosis Date    Asthma     Iron deficiency anemia due to chronic blood loss 7/19/2019    Iron deficiency anemia due to chronic blood loss 7/19/2019    Mental disorder     Bipolar     Postpartum endometritis 7/19/2019       Past Surgical History:   Procedure Laterality Date    HYSTEROSCOPY WITH DILATION AND CURETTAGE OF UTERUS N/A 7/19/2019    Procedure: HYSTEROSCOPY, WITH DILATION AND CURETTAGE OF UTERUS;  Surgeon: Cody Payne MD;  Location: [facility] OR;  Service: OB/GYN;  Laterality: N/A;  Insertion of transdermal device       Review of patient's allergies indicates:   Allergen Reactions    Grass pollen-orchardgrass, standard Rash       No current facility-administered medications on file prior to encounter.     Current Outpatient Medications on File Prior to Encounter   Medication Sig    acyclovir (ZOVIRAX) 400 MG tablet Take 1 tablet (400 mg total) by mouth 3 (three) times daily. for 10 days     Family History     Problem Relation (Age of Onset)    Hypertension Father        Tobacco Use    Smoking status: Current Every Day Smoker     Packs/day: 0.50    Smokeless tobacco: Never Used   Substance and Sexual Activity    Alcohol use: Not Currently     Comment: rarely    Drug use: Never    Sexual activity: Yes     Partners: Male     Review of Systems   Constitutional: Positive for activity change and appetite change. Negative for chills and fever.   HENT: Negative for congestion, rhinorrhea and sneezing.    Eyes: Negative for photophobia and visual disturbance.   Respiratory: Negative for cough, shortness of breath and wheezing.    Cardiovascular: Negative for chest pain, palpitations and leg swelling.   Gastrointestinal:        Per HPI   Endocrine: Negative for polydipsia and polyuria.   Genitourinary: Negative for difficulty urinating, flank pain and hematuria.   Musculoskeletal: Negative for arthralgias and myalgias.   Skin: Negative for rash and wound.   Neurological:  Negative for syncope and weakness.   Hematological: Does not bruise/bleed easily.   Psychiatric/Behavioral: Negative for confusion. The patient is not nervous/anxious.      Objective:     Vital Signs (Most Recent):  Temp: 98.1 °F (36.7 °C) (01/06/22 2150)  Pulse: 88 (01/06/22 2155)  Resp: 11 (01/06/22 2155)  BP: 91/63 (01/06/22 2205)  SpO2: 98 % (01/06/22 2155) Vital Signs (24h Range):  Temp:  [98 °F (36.7 °C)-98.4 °F (36.9 °C)] 98.1 °F (36.7 °C)  Pulse:  [] 88  Resp:  [11-20] 11  SpO2:  [98 %-99 %] 98 %  BP: ()/(54-66) 91/63     Weight: 54.4 kg (120 lb)  Body mass index is 18.79 kg/m².    Physical Exam  Vitals reviewed.   Constitutional:       General: She is not in acute distress.  HENT:      Head: Normocephalic and atraumatic.   Eyes:      General: No scleral icterus.  Cardiovascular:      Rate and Rhythm: Normal rate.   Pulmonary:      Effort: Pulmonary effort is normal. No respiratory distress.   Abdominal:      Tenderness: There is abdominal tenderness (over mid lower abdomen on patient palpation ).   Musculoskeletal:      Right lower leg: No edema.      Left lower leg: No edema.   Skin:     Coloration: Skin is not jaundiced.   Neurological:      General: No focal deficit present.      Mental Status: She is alert and oriented to person, place, and time.   Psychiatric:         Mood and Affect: Mood normal.             Significant Labs: All pertinent labs within the past 24 hours have been reviewed.    Significant Imaging: I have reviewed all pertinent imaging results/findings within the past 24 hours.

## 2022-01-07 NOTE — TRANSFER OF CARE
"Anesthesia Transfer of Care Note    Patient: Felicitas Santos    Procedure(s) Performed: Procedure(s) (LRB):  APPENDECTOMY, LAPAROSCOPIC (Right)    Patient location: PACU    Anesthesia Type: general    Transport from OR: Transported from OR on room air with adequate spontaneous ventilation    Post pain: adequate analgesia    Post assessment: no apparent anesthetic complications    Post vital signs: stable    Level of consciousness: sedated and responds to stimulation    Nausea/Vomiting: no nausea/vomiting    Complications: none    Transfer of care protocol was followed      Last vitals:   Visit Vitals  BP (!) 95/58   Pulse 98   Temp 36.7 °C (98 °F)   Resp 20   Ht 5' 7" (1.702 m)   Wt 54.4 kg (120 lb)   SpO2 99%   Breastfeeding No   BMI 18.79 kg/m²     "

## 2022-01-07 NOTE — SUBJECTIVE & OBJECTIVE
Interval History:  She is still having pain in the right lower quadrant.  Pain is 8/10 on pain intensity scale.  Denies nausea, vomiting, fever, chills or dysuria.    Review of Systems   Constitutional: Negative.    HENT: Negative.    Eyes: Negative.    Respiratory: Negative.    Cardiovascular: Negative.    Gastrointestinal: Positive for abdominal pain. Negative for nausea and vomiting.   Endocrine: Negative.    Genitourinary: Negative.    Musculoskeletal: Negative.    Skin: Negative.    Allergic/Immunologic: Negative.    Neurological: Negative.    Hematological: Negative.    Psychiatric/Behavioral: Negative.      Objective:     Vital Signs (Most Recent):  Temp: 98 °F (36.7 °C) (01/07/22 0720)  Pulse: 95 (01/07/22 0720)  Resp: 16 (01/07/22 0720)  BP: (!) 103/51 (01/07/22 0720)  SpO2: 100 % (01/07/22 0720) Vital Signs (24h Range):  Temp:  [97.4 °F (36.3 °C)-98.4 °F (36.9 °C)] 98 °F (36.7 °C)  Pulse:  [] 95  Resp:  [10-20] 16  SpO2:  [97 %-100 %] 100 %  BP: ()/(48-68) 103/51     Weight: 70.2 kg (154 lb 12.8 oz)  Body mass index is 24.25 kg/m².    Intake/Output Summary (Last 24 hours) at 1/7/2022 0759  Last data filed at 1/7/2022 0556  Gross per 24 hour   Intake 1692.42 ml   Output --   Net 1692.42 ml      Physical Exam  Constitutional:       Appearance: Normal appearance.   HENT:      Head: Normocephalic and atraumatic.      Right Ear: External ear normal.      Left Ear: External ear normal.      Nose: Nose normal.      Mouth/Throat:      Mouth: Mucous membranes are moist.   Eyes:      Extraocular Movements: Extraocular movements intact.   Cardiovascular:      Rate and Rhythm: Normal rate.      Pulses: Normal pulses.      Heart sounds: Normal heart sounds.   Pulmonary:      Effort: Pulmonary effort is normal.      Breath sounds: Normal breath sounds.   Abdominal:      General: Abdomen is flat. Bowel sounds are normal.      Palpations: Abdomen is soft.      Tenderness: There is abdominal tenderness.       Comments: Right lower quadrant   Musculoskeletal:         General: Normal range of motion.      Cervical back: Normal range of motion and neck supple.   Skin:     Capillary Refill: Capillary refill takes 2 to 3 seconds.   Neurological:      Mental Status: She is alert.   Psychiatric:         Mood and Affect: Mood normal.         Significant Labs: All pertinent labs within the past 24 hours have been reviewed.    Significant Imaging: I have reviewed all pertinent imaging results/findings within the past 24 hours.

## 2022-01-07 NOTE — ANESTHESIA PREPROCEDURE EVALUATION
01/06/2022  Felicitas Santos is a 31 y.o., female.    Anesthesia Evaluation    I have reviewed the Patient Summary Reports.    I have reviewed the Nursing Notes.    I have reviewed the Medications.     Review of Systems  Anesthesia Hx:  No problems with previous Anesthesia  Neg history of prior surgery. Denies Family Hx of Anesthesia complications.   Denies Personal Hx of Anesthesia complications.   Social:  Smoker    Hematology/Oncology:  Hematology Normal   Oncology Normal     EENT/Dental:EENT/Dental Normal   Cardiovascular:  Cardiovascular Normal     Pulmonary:   Asthma asymptomatic    Renal/:  Renal/ Normal     Hepatic/GI:  Hepatic/GI Normal    Musculoskeletal:  Musculoskeletal Normal    Neurological:  Neurology Normal    Endocrine:  Endocrine Normal    Dermatological:  Skin Normal    Psych:   Psychiatric History depression          Physical Exam  General:  Well nourished    Airway/Jaw/Neck:  Airway Findings: Mouth Opening: Normal Tongue: Normal  General Airway Assessment: Adult  Mallampati: II  TM Distance: 4 - 6 cm        Eyes/Ears/Nose:  EYES/EARS/NOSE FINDINGS: Normal   Dental:  DENTAL FINDINGS: Normal   Chest/Lungs:  Chest/Lungs Clear    Heart/Vascular:  Heart Findings: Normal Heart murmur: negative Vascular Findings: Normal    Abdomen:  Abdomen Findings: Normal    Musculoskeletal:  Musculoskeletal Findings: Normal   Skin:  Skin Findings: Normal    Mental Status:  Mental Status Findings: Normal        Anesthesia Plan  Type of Anesthesia, risks & benefits discussed:  Anesthesia Type:  general    Patient's Preference:   Plan Factors:          Intra-op Monitoring Plan: standard ASA monitors  Intra-op Monitoring Plan Comments:   Post Op Pain Control Plan:   Post Op Pain Control Plan Comments:     Induction:   IV  Beta Blocker:  Patient is not currently on a Beta-Blocker (No further documentation  required).       Informed Consent: Patient understands risks and agrees with Anesthesia plan.  Questions answered. Anesthesia consent signed with patient.  ASA Score: 2  emergent   Day of Surgery Review of History & Physical: I have interviewed and examined the patient. I have reviewed the patient's H&P dated:    H&P update referred to the provider.         Ready For Surgery From Anesthesia Perspective.

## 2022-01-07 NOTE — DISCHARGE SUMMARY
[facility] Medicine  Discharge Summary      Patient Name: Felicitas Santos  MRN: [id number]  Admission Date: 1/6/2022  Hospital Length of Stay: 0 days  Discharge Date and Time:  01/07/2022 9:39 AM  Attending Physician: Destiny Pate MD   Discharging Provider: Fabian Robert MD  Primary Care Provider: Primary Doctor No    Admitting diagnosis put:    Acute appendicitis.    Final diagnosis:    Acute appendicitis.     Reason for hospitalization:  To treat acute appendicitis.        HPI:  31-year-old female has been having mid and lower abdominal pain for 1 day duration pain has been getting progressively worse.  Pain is 10/10 on the pain intensity scale.  She was evaluated in emergency room had a CT scan of the abdomen that indicated early acute appendicitis.  She also had leukocytosis.  Surgery was called and recommended patient be admitted to hospitalist service.    Procedure(s) (LRB):  APPENDECTOMY, LAPAROSCOPIC (N/A)      Hospital Course:  Patient was placed on IV antibiotics.  She was given morphine for pain.  Surgical consult was obtained.  Patient was taken to surgery and had a laparoscopic appendectomy on 01/06/2022.  She tolerated procedure well.  She was seen by surgery this morning was cleared for discharge.  She be discharged on antibiotics for about 7 days.  He has follow up in surgical clinic about 2 weeks.    Consults:     Final Active Diagnoses:      Problems Resolved During this Admission:    Diagnosis Date Noted Date Resolved POA    PRINCIPAL PROBLEM:  Acute appendicitis with generalized peritonitis, without gangrene or abscess [K35.20] 01/06/2022 01/07/2022 Yes      Discharged Condition: good    Disposition: Home or Self Care    Follow Up:   Follow-up Information     Maria Guadalupe Askew MD On 1/21/2022.    Specialty: General Surgery  Why: Surgical follow up appointment scheduled with Dr. Askew for Friday, 1/21/2022, at 8:45.  Contact information:  [address] [place] [address]  [id number]  [phone]                       Patient Instructions:   No discharge procedures on file.  Medications:  Reconciled Home Medications:      Medication List      START taking these medications    ciprofloxacin HCl 500 MG tablet  Commonly known as: CIPRO  Take 1 tablet (500 mg total) by mouth every 12 (twelve) hours.     HYDROcodone-acetaminophen 5-325 mg per tablet  Commonly known as: NORCO  Take 1 tablet by mouth every 6 (six) hours as needed.     metroNIDAZOLE 500 MG tablet  Commonly known as: FLAGYL  Take 1 tablet (500 mg total) by mouth every 8 (eight) hours.        CONTINUE taking these medications    acyclovir 400 MG tablet  Commonly known as: ZOVIRAX  Take 1 tablet (400 mg total) by mouth 3 (three) times daily. for 10 days            Significant Diagnostic Studies: Radiology: CT scan: CT ABDOMEN PELVIS WITH CONTRAST: No results found for this visit on 01/06/22.    Pending Diagnostic Studies:     Procedure Component Value Units Date/Time    CT Abdomen Pelvis With Contrast [[id number]] Resulted: 01/07/22 0724    Order Status: Sent Lab Status: In process Updated: 01/07/22 0725    Specimen to Pathology, Surgery General Surgery [[id number]] Collected: 01/06/22 2130    Order Status: Sent Lab Status: In process Updated: 01/06/22 2153        Indwelling Lines/Drains at time of discharge:   Lines/Drains/Airways     None                 Time spent on the discharge of patient:18 minutes         Fabian Robert MD  Department of Hospital Medicine  [facility]

## 2022-01-07 NOTE — H&P
[facility] Medicine  History & Physical    Patient Name: Felicitas Santos  MRN: [id number]  Admission Date: 1/6/2022  Attending Physician: Destiny Pate MD   Primary Care Provider: Primary Doctor No         Patient information was obtained from patient and ER records.       Subjective:     Principal Problem:Acute appendicitis with generalized peritonitis, without gangrene or abscess    Chief Complaint:   Chief Complaint   Patient presents with    Abdominal Pain     Patient complain of abdominal pain and cramping denies nausea vomiting         HPI: The patient is a 32 y/o female with no significant PMH who presented with worsening abdominal pin in the mid to lower abdomen. Pain has been present for 24 hrs now and is a 10/10 at it's worse and improved to a 7/10 with pain medications. She was diagnosed with acute appendicitis and will be taken to surgery tonight. She denies any nausea, vomiting, diarrhea, fevers, chills, or other symptoms.         Past Medical History:   Diagnosis Date    Asthma     Iron deficiency anemia due to chronic blood loss 7/19/2019    Iron deficiency anemia due to chronic blood loss 7/19/2019    Mental disorder     Bipolar     Postpartum endometritis 7/19/2019       Past Surgical History:   Procedure Laterality Date    HYSTEROSCOPY WITH DILATION AND CURETTAGE OF UTERUS N/A 7/19/2019    Procedure: HYSTEROSCOPY, WITH DILATION AND CURETTAGE OF UTERUS;  Surgeon: Cody Payne MD;  Location: [facility];  Service: OB/GYN;  Laterality: N/A;  Insertion of transdermal device       Review of patient's allergies indicates:   Allergen Reactions    Grass pollen-orchardgrass, standard Rash       No current facility-administered medications on file prior to encounter.     Current Outpatient Medications on File Prior to Encounter   Medication Sig    acyclovir (ZOVIRAX) 400 MG tablet Take 1 tablet (400 mg total) by mouth 3 (three) times daily. for 10 days     Family History      Problem Relation (Age of Onset)    Hypertension Father        Tobacco Use    Smoking status: Current Every Day Smoker     Packs/day: 0.50    Smokeless tobacco: Never Used   Substance and Sexual Activity    Alcohol use: Not Currently     Comment: rarely    Drug use: Never    Sexual activity: Yes     Partners: Male     Review of Systems   Constitutional: Positive for activity change and appetite change. Negative for chills and fever.   HENT: Negative for congestion, rhinorrhea and sneezing.    Eyes: Negative for photophobia and visual disturbance.   Respiratory: Negative for cough, shortness of breath and wheezing.    Cardiovascular: Negative for chest pain, palpitations and leg swelling.   Gastrointestinal:        Per HPI   Endocrine: Negative for polydipsia and polyuria.   Genitourinary: Negative for difficulty urinating, flank pain and hematuria.   Musculoskeletal: Negative for arthralgias and myalgias.   Skin: Negative for rash and wound.   Neurological: Negative for syncope and weakness.   Hematological: Does not bruise/bleed easily.   Psychiatric/Behavioral: Negative for confusion. The patient is not nervous/anxious.      Objective:     Vital Signs (Most Recent):  Temp: 98.1 °F (36.7 °C) (01/06/22 2150)  Pulse: 88 (01/06/22 2155)  Resp: 11 (01/06/22 2155)  BP: 91/63 (01/06/22 2205)  SpO2: 98 % (01/06/22 2155) Vital Signs (24h Range):  Temp:  [98 °F (36.7 °C)-98.4 °F (36.9 °C)] 98.1 °F (36.7 °C)  Pulse:  [] 88  Resp:  [11-20] 11  SpO2:  [98 %-99 %] 98 %  BP: ()/(54-66) 91/63     Weight: 54.4 kg (120 lb)  Body mass index is 18.79 kg/m².    Physical Exam  Vitals reviewed.   Constitutional:       General: She is not in acute distress.  HENT:      Head: Normocephalic and atraumatic.   Eyes:      General: No scleral icterus.  Cardiovascular:      Rate and Rhythm: Normal rate.   Pulmonary:      Effort: Pulmonary effort is normal. No respiratory distress.   Abdominal:      Tenderness: There is abdominal  tenderness (over mid lower abdomen on patient palpation ).   Musculoskeletal:      Right lower leg: No edema.      Left lower leg: No edema.   Skin:     Coloration: Skin is not jaundiced.   Neurological:      General: No focal deficit present.      Mental Status: She is alert and oriented to person, place, and time.   Psychiatric:         Mood and Affect: Mood normal.             Significant Labs: All pertinent labs within the past 24 hours have been reviewed.    Significant Imaging: I have reviewed all pertinent imaging results/findings within the past 24 hours.    Assessment/Plan:     * Acute appendicitis with generalized peritonitis, without gangrene or abscess  Patient taken to surgery tonight, 1/6  Post op management per general surgery recs; appreciate assistance with this case  IVF, pain control, and antiemetics  Continue ertapenem        VTE Risk Mitigation (From admission, onward)    None            The attending portion of this evaluation, treatment, and documentation was performed per Destiny Pate MD via Telemedicine AudioVisual using the secure [employer] software platform with 2 way audio/video. The provider was located off-site and the patient is located in the hospital. The aforementioned video software was utilized to document the relevant history and physical exam      Destiny Pate MD  Department of Hospital Medicine   [facility]

## 2022-01-07 NOTE — PLAN OF CARE
01/07/22 1404   Final Note   Assessment Type Final Discharge Note   Anticipated Discharge Disposition Home   What phone number can be called within the next 1-3 days to see how you are doing after discharge? [phone]   Hospital Resources/Appts/Education Provided Appointments scheduled and added to AVS   Patient discharged to home prior to SW having opportunity to speak with her regarding discharge needs.  Hospital follow up appointment was scheduled and provided to patient by her nurse at discharge.  Patient's mother provided patient with safe transportation home.  SW spoke with patient's nurse and advised SW that patient was independent of ADL's and had no Case Management discharge needs.

## 2022-01-07 NOTE — PROGRESS NOTES
"Surgery note    Postop day 1 status post laparoscopic appendectomy.  Patient did well overnight.  Pain is well controlled.  Denies nausea vomiting.  Tolerating diet.  She has been up and out of bed.    Blood pressure (!) 103/51, pulse 95, temperature 98 °F (36.7 °C), temperature source Oral, resp. rate 16, height 5' 7" (1.702 m), weight 70.2 kg (154 lb 12.8 oz), SpO2 100 %, not currently breastfeeding.        Physical exam  Awake, alert  Regular rate and rhythm  Breathing comfortably on room air  Abdomen soft, appropriately tender, incisions clean and dry    Assessment/plan:  Okay for discharge from surgical standpoint.  Follow up in my clinic in 2 weeks.  Okay to shower.  No heavy lifting greater than 10 lb for 4 weeks.  "

## 2022-01-07 NOTE — PLAN OF CARE
REPORT GIVEN TO RADHA HERNANDEZ, PT TO FLOOR VIA BED, NO SIGNS OF DISTRESS NOTED. CALL AT PT SIDE. BED LOCKED.

## 2022-01-07 NOTE — HPI
The patient is a 30 y/o female with no significant PMH who presented with worsening abdominal pin in the mid to lower abdomen. Pain has been present for 24 hrs now and is a 10/10 at it's worse and improved to a 7/10 with pain medications. She was diagnosed with acute appendicitis and will be taken to surgery tonight. She denies any nausea, vomiting, diarrhea, fevers, chills, or other symptoms.

## 2022-01-07 NOTE — OP NOTE
[facility] Surgery  General Surgery  Operative Note    SUMMARY     Date of Procedure: 1/6/2022     Procedure: Procedure(s) (LRB):  APPENDECTOMY, LAPAROSCOPIC (Right)       Surgeon(s) and Role:     * Maria Guadalupe Askew MD - Primary    Assisting Surgeon: None    Pre-Operative Diagnosis: Appendicitis, unspecified appendicitis type [K37]    Post-Operative Diagnosis: Post-Op Diagnosis Codes:     * Acute appendicitis with generalized peritonitis, without gangrene or abscess, without perforation [K35.20]    Anesthesia: General    Operative Findings (including complications, if any): dilated and inflamed appendix without purulence or perforation    Estimated Blood Loss (EBL): * No values recorded between 1/6/2022  9:09 PM and 1/6/2022  9:41 PM *           Implants: * No implants in log *    Specimens:   Specimen (24h ago, onward)            None                  Condition: Good    Disposition: PACU - hemodynamically stable.    INDICATIONS:   Felicitas Santos is a 31 y.o. female presented to the emergency department with 24 hour history of lower abdominal pain.  Her abdominal pain worsened it was worse in the right lower quadrant.  On arrival to the emergency department she had a leukocytosis of 22, she is tachycardic to the 110s, and CT showed evidence of early acute appendicitis.  We elected to take the patient to the operating room for laparoscopic versus open appendectomy.     PROCEDURE IN DETAIL:  The patient was brought back into the Operative Room, placed on the table in the supine position.  General anesthesia was induced by the Anesthesia staff.  The patient's abdomen was then prepped and draped in the standard sterile surgical fashion.  Ram was placed prior to prep by the nursing staff.  Left arm was tucked.  OG tube was placed.  The patient was already received 1 g of Invanz IV in the emergency department.  I then instilled 10 mL of 0.25% Marcaine with epinephrine in the patient's planned surgical sites.     I then  made a infra umbilical incision.  Skin incision was carried down to fascia with Bovie electrocautery.  The umbilicus was elevated with towel clips.  The fascia around the umbilicus was dissected free and was opened in a vertical fashion with the bovie. Hemostat was used to spread open the fascia.  Prior to placement of Davis Trocar, a finger sweep was done and there were no adhesions to the anterior abdominal wall.  Davis trocar was placed and the abdomen was insufflated to 15 mmHg.  There was no bradycardia episodes during insufflation. The patient was then placed in a head down position in a left lateral roll.  Two additional 5 trocars were placed in the left lower abdomen under direct visualization.  These were 5-mm trocars.  At this point, attention was turned towards the appendix.  The appendix was visualized in the right  lower quadrant coming off the cecum.  The appendix was Dilated and inflamed.  At the base of the appendix, there was evidence of a healthy appendiceal base.  At this time, a mesenteric window was made between the mesoappendix and appendix. The base of the appendix was stapled off with a 35 mm purple load laparoscopic GWYN stapler.  A small cecal cap was taken with the specimen to ensure viability of the staple line given the nature of the appendicitis.   The mesoappendix was taken with a 35 mm gold load.  The appendix was then placed in EndoCatch bag and handed off as specimen.  Visualization of the appendiceal base at the cecum  revealed a hemostatic staple line, which was completely closed.  The bowel surrounding the staple line appeared completely viable.  The distal ileum and ileocecal valve were visualized inferior to the staple line. The patient was then placed back in the normal supine position.  Abdomen was desufflated.  The 5 mm trocars were removed under direct visualization.  There was no hemorrhage from these sites.  The Davis trocar was then removed.     Attention at this time  was turned towards the Reich port fascial defect.  0 Vicryl sutures were used in a figure-of-eight fashion to close the umbilical port site.    The skin and soft tissues were then closed with 3-0 Vicryl sutures in a simple subdermal fashion at all port sites.  A 4-0 Vicryl suture was used in a running subcuticular fashion to close the umbilical skin site.  Dermabond was placed over all surgical sites as dressing.  The patient's Ram was removed.  The patient was then successfully reversed from general anesthesia, extubated in the OR, transferred back to the Postoperative Care Unit in stable condition.  All counts were correct at the end of procedure including lap pads, instruments, as well as needles. Plan will be for admission to the hospitalist service for observation overnight for continued monitoring and pain control.

## 2022-01-07 NOTE — ANESTHESIA POSTPROCEDURE EVALUATION
Anesthesia Post Evaluation    Patient: Felicitas Santos    Procedure(s) Performed: Procedure(s) (LRB):  APPENDECTOMY, LAPAROSCOPIC (Right)    Final Anesthesia Type: general      Patient location during evaluation: PACU  Patient participation: Yes- Able to Participate  Level of consciousness: awake and awake and alert  Post-procedure vital signs: reviewed and stable  Pain management: adequate  Airway patency: patent    PONV status at discharge: No PONV  Anesthetic complications: no      Cardiovascular status: blood pressure returned to baseline  Respiratory status: unassisted and spontaneous ventilation  Hydration status: euvolemic  Follow-up not needed.          Vitals Value Taken Time   BP 85/57 01/06/22 2157   Temp 36.7 °C (98.1 °F) 01/06/22 2150   Pulse 81 01/06/22 2200   Resp 0 01/06/22 2200   SpO2 98 % 01/06/22 2200   Vitals shown include unvalidated device data.      No case tracking events are documented in the log.      Pain/Imani Score: Pain Rating Prior to Med Admin: 7 (1/6/2022  6:28 PM)  Imani Score: 9 (1/6/2022  9:51 PM)

## 2022-01-07 NOTE — NURSING
New orders received for discharge, patient and mother at bedside are agreeable with discharge. PIV removed, catheter tip intact. Dressing applied. Discharge teaching done at bedside, verbalized understanding. Presciptions given to patient to be filled at their pharmacy of choice. Medications reviewed, appointments given. Will d/c home with all belongings per w/c.

## 2022-01-07 NOTE — PROGRESS NOTES
[facility] Medicine  Progress Note    Patient Name: Felicitas Santos  MRN: [id number]  Patient Class: OP- Outpatient Recovery   Admission Date: 1/6/2022  Length of Stay: 0 days  Attending Physician: Destiny Pate MD  Primary Care Provider: Primary Doctor No        Subjective:     Principal Problem:Acute appendicitis with generalized peritonitis, without gangrene or abscess        HPI:  The patient is a 32 y/o female with no significant PMH who presented with worsening abdominal pin in the mid to lower abdomen. Pain has been present for 24 hrs now and is a 10/10 at it's worse and improved to a 7/10 with pain medications. She was diagnosed with acute appendicitis and will be taken to surgery tonight. She denies any nausea, vomiting, diarrhea, fevers, chills, or other symptoms.         Overview/Hospital Course:  No notes on file    Interval History:  She is still having pain in the right lower quadrant.  Pain is 8/10 on pain intensity scale.  Denies nausea, vomiting, fever, chills or dysuria.    Review of Systems   Constitutional: Negative.    HENT: Negative.    Eyes: Negative.    Respiratory: Negative.    Cardiovascular: Negative.    Gastrointestinal: Positive for abdominal pain. Negative for nausea and vomiting.   Endocrine: Negative.    Genitourinary: Negative.    Musculoskeletal: Negative.    Skin: Negative.    Allergic/Immunologic: Negative.    Neurological: Negative.    Hematological: Negative.    Psychiatric/Behavioral: Negative.      Objective:     Vital Signs (Most Recent):  Temp: 98 °F (36.7 °C) (01/07/22 0720)  Pulse: 95 (01/07/22 0720)  Resp: 16 (01/07/22 0720)  BP: (!) 103/51 (01/07/22 0720)  SpO2: 100 % (01/07/22 0720) Vital Signs (24h Range):  Temp:  [97.4 °F (36.3 °C)-98.4 °F (36.9 °C)] 98 °F (36.7 °C)  Pulse:  [] 95  Resp:  [10-20] 16  SpO2:  [97 %-100 %] 100 %  BP: ()/(48-68) 103/51     Weight: 70.2 kg (154 lb 12.8 oz)  Body mass index is 24.25 kg/m².    Intake/Output  Summary (Last 24 hours) at 1/7/2022 0759  Last data filed at 1/7/2022 0556  Gross per 24 hour   Intake 1692.42 ml   Output --   Net 1692.42 ml      Physical Exam  Constitutional:       Appearance: Normal appearance.   HENT:      Head: Normocephalic and atraumatic.      Right Ear: External ear normal.      Left Ear: External ear normal.      Nose: Nose normal.      Mouth/Throat:      Mouth: Mucous membranes are moist.   Eyes:      Extraocular Movements: Extraocular movements intact.   Cardiovascular:      Rate and Rhythm: Normal rate.      Pulses: Normal pulses.      Heart sounds: Normal heart sounds.   Pulmonary:      Effort: Pulmonary effort is normal.      Breath sounds: Normal breath sounds.   Abdominal:      General: Abdomen is flat. Bowel sounds are normal.      Palpations: Abdomen is soft.      Tenderness: There is abdominal tenderness.      Comments: Right lower quadrant   Musculoskeletal:         General: Normal range of motion.      Cervical back: Normal range of motion and neck supple.   Skin:     Capillary Refill: Capillary refill takes 2 to 3 seconds.   Neurological:      Mental Status: She is alert.   Psychiatric:         Mood and Affect: Mood normal.         Significant Labs: All pertinent labs within the past 24 hours have been reviewed.    Significant Imaging: I have reviewed all pertinent imaging results/findings within the past 24 hours.      Assessment/Plan:      * Acute appendicitis with generalized peritonitis, without gangrene or abscess  Patient taken to surgery tonight, 1/6  Post op management per general surgery recs; appreciate assistance with this case  IVF, pain control, and antiemetics  Continue ertapenem  Surgery today        VTE Risk Mitigation (From admission, onward)         Ordered     enoxaparin injection 40 mg  Daily         01/06/22 2217     IP VTE HIGH RISK PATIENT  Once         01/06/22 2217     Place sequential compression device  Until discontinued         01/06/22 2217                 Discharge Planning   NILE:      Code Status: Full Code   Is the patient medically ready for discharge?:     Reason for patient still in hospital (select all that apply): Treatment                     Fabian Robert MD  Department of [facility] Medicine   [facility]

## 2022-01-07 NOTE — ASSESSMENT & PLAN NOTE
Patient taken to surgery tonight, 1/6  Post op management per general surgery recs; appreciate assistance with this case  IVF, pain control, and antiemetics  Continue ertapenem  Surgery today

## 2022-01-07 NOTE — ASSESSMENT & PLAN NOTE
Patient taken to surgery tonight, 1/6  Post op management per general surgery recs; appreciate assistance with this case  IVF, pain control, and antiemetics  Continue ertapenem

## 2022-01-08 LAB — BACTERIA UR CULT: NO GROWTH

## 2022-01-10 LAB
FINAL PATHOLOGIC DIAGNOSIS: NORMAL
GROSS: NORMAL
Lab: NORMAL

## 2022-09-13 NOTE — PLAN OF CARE
Blood specimen collected for lab work. Labeled in front of patient and sent to lab. Tolerated well.   Cleared at this time for upcoming curgical procedure with planned L4-L5 L TLIF anytime over the next 30 days.

## 2023-11-03 ENCOUNTER — HOSPITAL ENCOUNTER (OUTPATIENT)
Facility: HOSPITAL | Age: 33
Discharge: HOME | End: 2023-11-05
Attending: EMERGENCY MEDICINE | Admitting: INTERNAL MEDICINE
Payer: MEDICAID

## 2023-11-03 DIAGNOSIS — R07.9 CHEST PAIN: ICD-10-CM

## 2023-11-03 DIAGNOSIS — G25.71 AKATHISIA: ICD-10-CM

## 2023-11-03 DIAGNOSIS — R41.0 DELIRIUM: Primary | ICD-10-CM

## 2023-11-03 DIAGNOSIS — R41.82 ALTERED MENTAL STATUS: ICD-10-CM

## 2023-11-03 LAB
ALBUMIN SERPL BCP-MCNC: 3.8 G/DL (ref 3.5–5.2)
ALP SERPL-CCNC: 65 U/L (ref 55–135)
ALT SERPL W/O P-5'-P-CCNC: 14 U/L (ref 10–44)
ANION GAP SERPL CALC-SCNC: 10 MMOL/L (ref 8–16)
AST SERPL-CCNC: 24 U/L (ref 10–40)
BASOPHILS # BLD AUTO: 0.04 K/UL (ref 0–0.2)
BASOPHILS NFR BLD: 0.3 % (ref 0–1.9)
BILIRUB SERPL-MCNC: 0.4 MG/DL (ref 0.1–1)
BUN SERPL-MCNC: 7 MG/DL (ref 6–20)
CALCIUM SERPL-MCNC: 9.3 MG/DL (ref 8.7–10.5)
CHLORIDE SERPL-SCNC: 103 MMOL/L (ref 95–110)
CO2 SERPL-SCNC: 23 MMOL/L (ref 23–29)
CREAT SERPL-MCNC: 0.8 MG/DL (ref 0.5–1.4)
DIFFERENTIAL METHOD: ABNORMAL
EOSINOPHIL # BLD AUTO: 0.2 K/UL (ref 0–0.5)
EOSINOPHIL NFR BLD: 1.1 % (ref 0–8)
ERYTHROCYTE [DISTWIDTH] IN BLOOD BY AUTOMATED COUNT: 13.3 % (ref 11.5–14.5)
EST. GFR  (NO RACE VARIABLE): >60 ML/MIN/1.73 M^2
GLUCOSE SERPL-MCNC: 100 MG/DL (ref 70–110)
HCT VFR BLD AUTO: 38.2 % (ref 37–48.5)
HGB BLD-MCNC: 13.1 G/DL (ref 12–16)
IMM GRANULOCYTES # BLD AUTO: 0.06 K/UL (ref 0–0.04)
IMM GRANULOCYTES NFR BLD AUTO: 0.4 % (ref 0–0.5)
LACTATE SERPL-SCNC: 1.4 MMOL/L (ref 0.5–2.2)
LYMPHOCYTES # BLD AUTO: 3.1 K/UL (ref 1–4.8)
LYMPHOCYTES NFR BLD: 21.2 % (ref 18–48)
MAGNESIUM SERPL-MCNC: 1.6 MG/DL (ref 1.6–2.6)
MCH RBC QN AUTO: 30.3 PG (ref 27–31)
MCHC RBC AUTO-ENTMCNC: 34.3 G/DL (ref 32–36)
MCV RBC AUTO: 88 FL (ref 82–98)
MONOCYTES # BLD AUTO: 1.2 K/UL (ref 0.3–1)
MONOCYTES NFR BLD: 7.9 % (ref 4–15)
NEUTROPHILS # BLD AUTO: 10.2 K/UL (ref 1.8–7.7)
NEUTROPHILS NFR BLD: 69.1 % (ref 38–73)
NRBC BLD-RTO: 0 /100 WBC
PLATELET # BLD AUTO: 228 K/UL (ref 150–450)
PMV BLD AUTO: 9.9 FL (ref 9.2–12.9)
POTASSIUM SERPL-SCNC: 4 MMOL/L (ref 3.5–5.1)
PROT SERPL-MCNC: 6.9 G/DL (ref 6–8.4)
RBC # BLD AUTO: 4.32 M/UL (ref 4–5.4)
SODIUM SERPL-SCNC: 136 MMOL/L (ref 136–145)
WBC # BLD AUTO: 14.69 K/UL (ref 3.9–12.7)

## 2023-11-03 PROCEDURE — 80307 DRUG TEST PRSMV CHEM ANLYZR: CPT | Performed by: EMERGENCY MEDICINE

## 2023-11-03 PROCEDURE — 25000003 PHARM REV CODE 250: Performed by: EMERGENCY MEDICINE

## 2023-11-03 PROCEDURE — 96361 HYDRATE IV INFUSION ADD-ON: CPT

## 2023-11-03 PROCEDURE — 85025 COMPLETE CBC W/AUTO DIFF WBC: CPT | Performed by: EMERGENCY MEDICINE

## 2023-11-03 PROCEDURE — 83735 ASSAY OF MAGNESIUM: CPT | Performed by: EMERGENCY MEDICINE

## 2023-11-03 PROCEDURE — 82550 ASSAY OF CK (CPK): CPT | Performed by: EMERGENCY MEDICINE

## 2023-11-03 PROCEDURE — 81025 URINE PREGNANCY TEST: CPT | Performed by: EMERGENCY MEDICINE

## 2023-11-03 PROCEDURE — 96374 THER/PROPH/DIAG INJ IV PUSH: CPT

## 2023-11-03 PROCEDURE — 83605 ASSAY OF LACTIC ACID: CPT | Performed by: EMERGENCY MEDICINE

## 2023-11-03 PROCEDURE — 80053 COMPREHEN METABOLIC PANEL: CPT | Performed by: EMERGENCY MEDICINE

## 2023-11-03 PROCEDURE — 81003 URINALYSIS AUTO W/O SCOPE: CPT | Mod: 59 | Performed by: EMERGENCY MEDICINE

## 2023-11-03 PROCEDURE — 99285 EMERGENCY DEPT VISIT HI MDM: CPT | Mod: 25

## 2023-11-03 PROCEDURE — 82077 ASSAY SPEC XCP UR&BREATH IA: CPT | Performed by: EMERGENCY MEDICINE

## 2023-11-03 PROCEDURE — 84443 ASSAY THYROID STIM HORMONE: CPT | Performed by: EMERGENCY MEDICINE

## 2023-11-03 PROCEDURE — 63600175 PHARM REV CODE 636 W HCPCS: Performed by: EMERGENCY MEDICINE

## 2023-11-03 RX ORDER — BUPRENORPHINE AND NALOXONE 8; 2 MG/1; MG/1
1 FILM, SOLUBLE BUCCAL; SUBLINGUAL 2 TIMES DAILY
COMMUNITY
Start: 2023-10-27

## 2023-11-03 RX ORDER — OXCARBAZEPINE 300 MG/1
300 TABLET, FILM COATED ORAL 3 TIMES DAILY
COMMUNITY
Start: 2023-10-27

## 2023-11-03 RX ORDER — LORAZEPAM 2 MG/ML
2 INJECTION INTRAMUSCULAR
Status: COMPLETED | OUTPATIENT
Start: 2023-11-03 | End: 2023-11-03

## 2023-11-03 RX ADMIN — SODIUM CHLORIDE 1000 ML: 9 INJECTION, SOLUTION INTRAVENOUS at 11:11

## 2023-11-03 RX ADMIN — LORAZEPAM 2 MG: 2 INJECTION INTRAMUSCULAR; INTRAVENOUS at 11:11

## 2023-11-03 NOTE — Clinical Note
Diagnosis: Akathisia [[id number]]   Future Attending Provider: JESSIE TAN [[id number]]   Admitting Provider:: JESSIE TAN [[id number]]

## 2023-11-03 NOTE — LETTER
11/05/2023                    [address] [facility] [address]  Phone: [phone]  Fax: [phone]   11/05/2023    Patient: Felicitas Santos   YOB: 1990   Date of Visit: 11/3/2023       To Whom it May Concern:    Felicitas Santos was admitted to hospital from 11/3/23-11/5/23. She may return to work on 11/8/23 .    If you have any questions or concerns, please don't hesitate to call.    Sincerely,         Juan Langston MD

## 2023-11-04 PROBLEM — D72.829 LEUKOCYTOSIS: Status: ACTIVE | Noted: 2023-11-04

## 2023-11-04 PROBLEM — G25.71 AKATHISIA: Status: ACTIVE | Noted: 2023-11-04

## 2023-11-04 PROBLEM — D50.0 IRON DEFICIENCY ANEMIA DUE TO CHRONIC BLOOD LOSS: Status: RESOLVED | Noted: 2019-07-19 | Resolved: 2023-11-04

## 2023-11-04 PROBLEM — Z30.017 INSERTION OF IMPLANTABLE SUBDERMAL CONTRACEPTIVE: Status: RESOLVED | Noted: 2019-07-19 | Resolved: 2023-11-04

## 2023-11-04 PROBLEM — F11.21 OPIOID DEPENDENCE IN REMISSION: Status: ACTIVE | Noted: 2023-11-04

## 2023-11-04 PROBLEM — F99 PSYCHIATRIC ILLNESS: Status: ACTIVE | Noted: 2023-11-04

## 2023-11-04 PROBLEM — F17.210 MODERATE CIGARETTE SMOKER: Status: ACTIVE | Noted: 2023-11-04

## 2023-11-04 PROBLEM — R41.0 DELIRIUM: Status: ACTIVE | Noted: 2023-11-04

## 2023-11-04 LAB
AMMONIA PLAS-SCNC: 37 UMOL/L (ref 10–50)
AMPHET+METHAMPHET UR QL: NEGATIVE
B-HCG UR QL: NEGATIVE
BARBITURATES UR QL SCN>200 NG/ML: NEGATIVE
BENZODIAZ UR QL SCN>200 NG/ML: NEGATIVE
BILIRUB UR QL STRIP: NEGATIVE
BZE UR QL SCN: NEGATIVE
CANNABINOIDS UR QL SCN: NEGATIVE
CK SERPL-CCNC: 105 U/L (ref 20–180)
CLARITY UR: CLEAR
COLOR UR: YELLOW
CREAT UR-MCNC: 29.8 MG/DL (ref 15–325)
CRP SERPL-MCNC: 45.9 MG/L (ref 0–8.2)
D DIMER PPP IA.FEU-MCNC: 0.28 MG/L FEU
ERYTHROCYTE [SEDIMENTATION RATE] IN BLOOD BY WESTERGREN METHOD: 16 MM/HR (ref 0–20)
ETHANOL SERPL-MCNC: <10 MG/DL (ref 0–10)
GLUCOSE UR QL STRIP: NEGATIVE
HGB UR QL STRIP: NEGATIVE
HIV 1+2 AB+HIV1 P24 AG SERPL QL IA: NORMAL
KETONES UR QL STRIP: NEGATIVE
LEUKOCYTE ESTERASE UR QL STRIP: NEGATIVE
METHADONE UR QL SCN>300 NG/ML: NEGATIVE
NITRITE UR QL STRIP: NEGATIVE
OPIATES UR QL SCN: NEGATIVE
PCP UR QL SCN>25 NG/ML: NEGATIVE
PH UR STRIP: >8 [PH] (ref 5–8)
PROCALCITONIN SERPL IA-MCNC: 0.05 NG/ML
PROT UR QL STRIP: NEGATIVE
SP GR UR STRIP: 1.02 (ref 1–1.03)
TOXICOLOGY INFORMATION: NORMAL
TSH SERPL DL<=0.005 MIU/L-ACNC: 0.79 UIU/ML (ref 0.4–4)
URN SPEC COLLECT METH UR: ABNORMAL
UROBILINOGEN UR STRIP-ACNC: NEGATIVE EU/DL

## 2023-11-04 PROCEDURE — 86592 SYPHILIS TEST NON-TREP QUAL: CPT | Performed by: INTERNAL MEDICINE

## 2023-11-04 PROCEDURE — 84145 PROCALCITONIN (PCT): CPT | Performed by: INTERNAL MEDICINE

## 2023-11-04 PROCEDURE — G0378 HOSPITAL OBSERVATION PER HR: HCPCS

## 2023-11-04 PROCEDURE — 63600175 PHARM REV CODE 636 W HCPCS: Performed by: EMERGENCY MEDICINE

## 2023-11-04 PROCEDURE — 87040 BLOOD CULTURE FOR BACTERIA: CPT | Performed by: EMERGENCY MEDICINE

## 2023-11-04 PROCEDURE — 82140 ASSAY OF AMMONIA: CPT | Performed by: INTERNAL MEDICINE

## 2023-11-04 PROCEDURE — 94761 N-INVAS EAR/PLS OXIMETRY MLT: CPT

## 2023-11-04 PROCEDURE — 99223 1ST HOSP IP/OBS HIGH 75: CPT | Mod: ,,, | Performed by: INTERNAL MEDICINE

## 2023-11-04 PROCEDURE — 25000003 PHARM REV CODE 250: Performed by: STUDENT IN AN ORGANIZED HEALTH CARE EDUCATION/TRAINING PROGRAM

## 2023-11-04 PROCEDURE — 25000003 PHARM REV CODE 250: Mod: UD | Performed by: EMERGENCY MEDICINE

## 2023-11-04 PROCEDURE — 99900035 HC TECH TIME PER 15 MIN (STAT)

## 2023-11-04 PROCEDURE — 87389 HIV-1 AG W/HIV-1&-2 AB AG IA: CPT | Performed by: INTERNAL MEDICINE

## 2023-11-04 PROCEDURE — S4991 NICOTINE PATCH NONLEGEND: HCPCS | Performed by: STUDENT IN AN ORGANIZED HEALTH CARE EDUCATION/TRAINING PROGRAM

## 2023-11-04 PROCEDURE — 85379 FIBRIN DEGRADATION QUANT: CPT | Performed by: EMERGENCY MEDICINE

## 2023-11-04 PROCEDURE — 36415 COLL VENOUS BLD VENIPUNCTURE: CPT | Performed by: EMERGENCY MEDICINE

## 2023-11-04 PROCEDURE — 80321 ALCOHOLS BIOMARKERS 1OR 2: CPT | Performed by: INTERNAL MEDICINE

## 2023-11-04 PROCEDURE — 96361 HYDRATE IV INFUSION ADD-ON: CPT

## 2023-11-04 PROCEDURE — 86140 C-REACTIVE PROTEIN: CPT | Performed by: INTERNAL MEDICINE

## 2023-11-04 PROCEDURE — 93005 ELECTROCARDIOGRAM TRACING: CPT

## 2023-11-04 PROCEDURE — 99223 PR INITIAL HOSPITAL CARE,LEVL III: ICD-10-PCS | Mod: ,,, | Performed by: INTERNAL MEDICINE

## 2023-11-04 PROCEDURE — 96372 THER/PROPH/DIAG INJ SC/IM: CPT | Mod: 59 | Performed by: EMERGENCY MEDICINE

## 2023-11-04 PROCEDURE — 85651 RBC SED RATE NONAUTOMATED: CPT | Performed by: INTERNAL MEDICINE

## 2023-11-04 PROCEDURE — 96376 TX/PRO/DX INJ SAME DRUG ADON: CPT

## 2023-11-04 RX ORDER — IBUPROFEN 200 MG
1 TABLET ORAL DAILY PRN
Status: DISCONTINUED | OUTPATIENT
Start: 2023-11-04 | End: 2023-11-05 | Stop reason: HOSPADM

## 2023-11-04 RX ORDER — MIRTAZAPINE 15 MG/1
15 TABLET, FILM COATED ORAL NIGHTLY
COMMUNITY

## 2023-11-04 RX ORDER — SODIUM CHLORIDE 0.9 % (FLUSH) 0.9 %
10 SYRINGE (ML) INJECTION EVERY 12 HOURS PRN
Status: DISCONTINUED | OUTPATIENT
Start: 2023-11-04 | End: 2023-11-05 | Stop reason: HOSPADM

## 2023-11-04 RX ORDER — NALOXONE HCL 0.4 MG/ML
0.02 VIAL (ML) INJECTION
Status: DISCONTINUED | OUTPATIENT
Start: 2023-11-04 | End: 2023-11-05 | Stop reason: HOSPADM

## 2023-11-04 RX ORDER — ONDANSETRON 2 MG/ML
4 INJECTION INTRAMUSCULAR; INTRAVENOUS EVERY 8 HOURS PRN
Status: DISCONTINUED | OUTPATIENT
Start: 2023-11-04 | End: 2023-11-05 | Stop reason: HOSPADM

## 2023-11-04 RX ORDER — OXCARBAZEPINE 300 MG/1
300 TABLET, FILM COATED ORAL 2 TIMES DAILY
Status: DISCONTINUED | OUTPATIENT
Start: 2023-11-04 | End: 2023-11-05 | Stop reason: HOSPADM

## 2023-11-04 RX ORDER — PROCHLORPERAZINE EDISYLATE 5 MG/ML
5 INJECTION INTRAMUSCULAR; INTRAVENOUS EVERY 6 HOURS PRN
Status: DISCONTINUED | OUTPATIENT
Start: 2023-11-04 | End: 2023-11-05 | Stop reason: HOSPADM

## 2023-11-04 RX ORDER — GLUCAGON 1 MG
1 KIT INJECTION
Status: DISCONTINUED | OUTPATIENT
Start: 2023-11-04 | End: 2023-11-05 | Stop reason: HOSPADM

## 2023-11-04 RX ORDER — OLANZAPINE 10 MG/2ML
10 INJECTION, POWDER, FOR SOLUTION INTRAMUSCULAR ONCE
Status: COMPLETED | OUTPATIENT
Start: 2023-11-04 | End: 2023-11-04

## 2023-11-04 RX ORDER — IPRATROPIUM BROMIDE AND ALBUTEROL SULFATE 2.5; .5 MG/3ML; MG/3ML
3 SOLUTION RESPIRATORY (INHALATION) EVERY 4 HOURS PRN
Status: DISCONTINUED | OUTPATIENT
Start: 2023-11-04 | End: 2023-11-05 | Stop reason: HOSPADM

## 2023-11-04 RX ORDER — IBUPROFEN 200 MG
24 TABLET ORAL
Status: DISCONTINUED | OUTPATIENT
Start: 2023-11-04 | End: 2023-11-05 | Stop reason: HOSPADM

## 2023-11-04 RX ORDER — ACETAMINOPHEN 650 MG/1
650 SUPPOSITORY RECTAL EVERY 4 HOURS PRN
Status: DISCONTINUED | OUTPATIENT
Start: 2023-11-04 | End: 2023-11-04

## 2023-11-04 RX ORDER — MIRTAZAPINE 7.5 MG/1
15 TABLET, FILM COATED ORAL NIGHTLY
Status: DISCONTINUED | OUTPATIENT
Start: 2023-11-04 | End: 2023-11-05 | Stop reason: HOSPADM

## 2023-11-04 RX ORDER — BUPRENORPHINE AND NALOXONE 8; 2 MG/1; MG/1
1 FILM, SOLUBLE BUCCAL; SUBLINGUAL 2 TIMES DAILY
Status: DISCONTINUED | OUTPATIENT
Start: 2023-11-04 | End: 2023-11-05 | Stop reason: RX

## 2023-11-04 RX ORDER — LORAZEPAM 2 MG/ML
2 INJECTION INTRAMUSCULAR
Status: COMPLETED | OUTPATIENT
Start: 2023-11-04 | End: 2023-11-04

## 2023-11-04 RX ORDER — IBUPROFEN 200 MG
16 TABLET ORAL
Status: DISCONTINUED | OUTPATIENT
Start: 2023-11-04 | End: 2023-11-05 | Stop reason: HOSPADM

## 2023-11-04 RX ADMIN — Medication 1 PATCH: at 06:11

## 2023-11-04 RX ADMIN — SODIUM CHLORIDE 1000 ML: 9 INJECTION, SOLUTION INTRAVENOUS at 01:11

## 2023-11-04 RX ADMIN — OLANZAPINE 10 MG: 10 INJECTION, POWDER, FOR SOLUTION INTRAMUSCULAR at 03:11

## 2023-11-04 RX ADMIN — LORAZEPAM 2 MG: 2 INJECTION INTRAMUSCULAR; INTRAVENOUS at 01:11

## 2023-11-04 NOTE — ED NOTES
Dr. Weber in with patient and sister for telemedicine visit. Patient unwilling or unable to hold eyes open for exam. Continues  to gross twitching and thrashing in bed. See updated neuro re-assessment.

## 2023-11-04 NOTE — PLAN OF CARE
Problem: Gas Exchange Impaired  Goal: Optimal Gas Exchange  Outcome: Ongoing, Progressing  Intervention: Optimize Oxygenation and Ventilation  Flowsheets (Taken 11/4/2023 6224)  Airway/Ventilation Management: airway patency maintained  Head of Bed (HOB) Positioning: HOB elevated   Patient in no apparent distress. Sat's 99  % on room air. PRN treatments not needed . Will continue to monitor.

## 2023-11-04 NOTE — ASSESSMENT & PLAN NOTE
She is not on a try anti-psychotic agent, dopamine-blocker, so an odd dystonic reaction seems less likely  She does have rapid arm and leg flailing  Hopefully if this is medication induced (or drug) it will wear off soon  Tele psych consult

## 2023-11-04 NOTE — HPI
"Ms. Santos is a 31yo lady with a past medical history of asthma, Fe deficiency anemia, opioid abuse, and bipolar disorder.  Ms. Santos is very confused and agitated on my interview, unable to provide any meaningful history.  Luckily, her sister, Brandy, is at the bedside and reports the last 24 hours events.  Brandy tells me that Ms. Santos became addicted to pain pills just shy of a year ago.  She checked herself into a local rehab facility, [facility].      After her stint inpatient, she has been completely clean for the past 8 months.  Apparently 3 months ago her psychiatry doctor from the rehab facility started her on Remeron, Suboxone and Trileptal (Oxycarbazepine).  She took all the meds for a month, then ran out of only the Trileptal.  After her doctor realized this, she called this back in, which she began taking again just recently 1 week ago.  She never stopped the Suboxone or Remeron as far as her sister is aware.    She had been doing well up until last night around 9pm when she suddenly developed agitation, flailing of her limbs, jerking movements and muscle spasms.  Her sister states she was lucid and conversant at first and told her she just could not control the movements.  They finally decided to bring her to the hospital.  Right as she arrived, Brandy then began to notice concurrent confusion and strange behavior from a mental standpoint.  She is unable to tell me if she has been taking any OTC meds to excess, such as benadryl, Tylenol or Robitussin SM.      In the ED her VS were BP (!) 146/79 (BP Location: Left arm, Patient Position: Lying)   Pulse 108   Temp 98 °F (36.7 °C) (Oral)   Resp 18   Ht 5' 7" (1.702 m)   Wt 81.6 kg (180 lb)   LMP 10/12/2023 (Approximate)   SpO2 98%   Breastfeeding No   BMI 28.19 kg/m².  Labs showed WBC 14.7, Cr 0.8, NML CMP, , LA 1.4, TSH 0.79, UPT neg, ETOH < 10, UDS negative, UA NML.    CXR shows no infiltrates and borderline " enlarged cardiac silhouette.  CT head without contrast showed no evidence of intracranial hemorrhage.  EKG showed NSR rate 94, NS ST-T changes (flipped T waves anterolateral), QTc 477 ms.    In the ED she was treated with:  Medications   sodium chloride 0.9% bolus 1,000 mL 1,000 mL (0 mLs Intravenous Stopped 11/4/23 0018)   LORazepam injection 2 mg (2 mg Intravenous Given 11/3/23 2314)   sodium chloride 0.9% bolus 1,000 mL 1,000 mL (1,000 mLs Intravenous New Bag 11/4/23 0152)   LORazepam injection 2 mg (2 mg Intravenous Given 11/4/23 0152)   OLANZapine injection 10 mg (10 mg Intramuscular Given 11/4/23 4878)

## 2023-11-04 NOTE — ASSESSMENT & PLAN NOTE
"I'm not sure what her true underlying psychiatric illness is  Some have anxiety and depression listed, others have bipolar - I cannot confirm any of these  Holding home meds PO for now until worked out    "In two studies, the effects of the potentially interacting drugs mirtazapine and carbamazepine on their pharmacokinetics have been investigated. Subjects were treated with carbamazepine combined with placebo for 21 days and subsequently with carbamazepine combined with mirtazapine for another 7 days (Study A) or with mirtazapine combined with placebo for 7 days and subsequently mirtazapine combined with carbamazepine for another 21 days (Study B). Pharmacokinetic results indicate that carbamazepine decreased significantly AUC and Cmax values for mirtazapine and increased values for demethyl-mirtazapine Cmax. Mirtazapine had no effect on carbamazepine pharmacokinetic parameters, but did lower carbamazepine-10,11-epoxide levels. Mirtazapine did not affect the single dose kinetics or the enzyme inducing properties of carbamazepine. VAMRS alertness scores reflected the somnolence-inducing effects of carbamazepine and mirtazapine and psychometric test results revealed impairment of specific tasks. The combination was safe and routine laboratory testing did not reveal clinically relevant abnormalities." - Drug-drug interaction studies with mirtazapine and carbamazepine in healthy male subjects.  Eur J Drug Metab Pharmacokinet  . 2001 Jan-Jun;26(1-2):109-21. doi: 10.1007/RF00982149.    "

## 2023-11-04 NOTE — SUBJECTIVE & OBJECTIVE
"Past Medical History:   Diagnosis Date    Anxiety and depression     Appendicitis     Asthma     Bipolar disorder, unspecified     Dental caries     Necrotic teeth and periapical abscesses in past    Iron deficiency anemia due to chronic blood loss 07/19/2019    Opiate addiction     Denies ever using IV drugs, just "pills"    Postpartum endometritis 07/19/2019       Past Surgical History:   Procedure Laterality Date    HYSTEROSCOPY WITH DILATION AND CURETTAGE OF UTERUS N/A 07/19/2019    Procedure: HYSTEROSCOPY, WITH DILATION AND CURETTAGE OF UTERUS;  Surgeon: Cody Payne MD;  Location: [facility] OR;  Service: OB/GYN;  Laterality: N/A;  Insertion of transdermal device    LAPAROSCOPIC APPENDECTOMY N/A 01/06/2022    Procedure: APPENDECTOMY, LAPAROSCOPIC;  Surgeon: Maria Guadalupe Askew MD;  Location: [facility] OR;  Service: General;  Laterality: N/A;       Review of patient's allergies indicates:   Allergen Reactions    Grass pollen-orchardgrass, standard Rash       No current facility-administered medications on file prior to encounter.     Current Outpatient Medications on File Prior to Encounter   Medication Sig    acyclovir (ZOVIRAX) 400 MG tablet Take 1 tablet (400 mg total) by mouth 3 (three) times daily. for 10 days    buprenorphine-naloxone 8-2 mg (SUBOXONE) 8-2 mg Place 1 Film under the tongue 2 (two) times daily.    OXcarbazepine (TRILEPTAL) 300 MG Tab Take 300 mg by mouth 3 (three) times daily.     Family History       Problem Relation (Age of Onset)    Asthma Mother    Breast cancer Maternal Grandmother    Hypertension Father          Tobacco Use    Smoking status: Every Day     Current packs/day: 0.50     Types: Cigarettes    Smokeless tobacco: Never   Substance and Sexual Activity    Alcohol use: Not Currently     Comment: rarely    Drug use: Never    Sexual activity: Yes     Partners: Male     Review of Systems   Unable to perform ROS: Mental status change     Objective:     Vital Signs (Most Recent):  Temp: " 98.4 °F (36.9 °C) (11/04/23 0456)  Pulse: 101 (11/04/23 0456)  Resp: 19 (11/04/23 0456)  BP: (!) 141/70 (11/04/23 0456)  SpO2: 97 % (11/04/23 0456) Vital Signs (24h Range):  Temp:  [98 °F (36.7 °C)-98.4 °F (36.9 °C)] 98.4 °F (36.9 °C)  Pulse:  [] 101  Resp:  [18-19] 19  SpO2:  [97 %-98 %] 97 %  BP: (141-146)/(70-79) 141/70     Weight: 81.6 kg (180 lb)  Body mass index is 28.19 kg/m².     Physical Exam  Constitutional:       Appearance: She is overweight. She is not ill-appearing, toxic-appearing or diaphoretic.   HENT:      Head: Normocephalic and atraumatic.   Pulmonary:      Effort: No tachypnea or bradypnea.   Genitourinary:     Comments: No lester in place  Neurological:      Mental Status: She is lethargic and disoriented.      Comments: Ms. Santos is unable to remain still on the gurney and has flailing of her extremities at times and periods of shaking her head side to side while mumbling, then suddenly sitting up and raising her legs in the air then appearing to try to climb out of the bed.  She is easily redirected by her sister.   Psychiatric:         Attention and Perception: She is inattentive.         Mood and Affect: Mood is anxious. Affect is labile.         Speech: She is noncommunicative. Speech is slurred.         Behavior: Behavior is uncooperative and agitated.         Cognition and Memory: Cognition is impaired. Memory is impaired. She exhibits impaired recent memory and impaired remote memory.                Significant Labs: All pertinent labs within the past 24 hours have been reviewed.  Recent Results (from the past 24 hour(s))   Complete Blood Count (CBC)    Collection Time: 11/03/23 11:18 PM   Result Value Ref Range    WBC 14.69 (H) 3.90 - 12.70 K/uL    RBC 4.32 4.00 - 5.40 M/uL    Hemoglobin 13.1 12.0 - 16.0 g/dL    Hematocrit 38.2 37.0 - 48.5 %    MCV 88 82 - 98 fL    MCH 30.3 27.0 - 31.0 pg    MCHC 34.3 32.0 - 36.0 g/dL    RDW 13.3 11.5 - 14.5 %    Platelets 228 150 - 450 K/uL     MPV 9.9 9.2 - 12.9 fL    Immature Granulocytes 0.4 0.0 - 0.5 %    Gran # (ANC) 10.2 (H) 1.8 - 7.7 K/uL    Immature Grans (Abs) 0.06 (H) 0.00 - 0.04 K/uL    Lymph # 3.1 1.0 - 4.8 K/uL    Mono # 1.2 (H) 0.3 - 1.0 K/uL    Eos # 0.2 0.0 - 0.5 K/uL    Baso # 0.04 0.00 - 0.20 K/uL    nRBC 0 0 /100 WBC    Gran % 69.1 38.0 - 73.0 %    Lymph % 21.2 18.0 - 48.0 %    Mono % 7.9 4.0 - 15.0 %    Eosinophil % 1.1 0.0 - 8.0 %    Basophil % 0.3 0.0 - 1.9 %    Differential Method Automated    Comprehensive Metabolic Panel (CMP)    Collection Time: 11/03/23 11:18 PM   Result Value Ref Range    Sodium 136 136 - 145 mmol/L    Potassium 4.0 3.5 - 5.1 mmol/L    Chloride 103 95 - 110 mmol/L    CO2 23 23 - 29 mmol/L    Glucose 100 70 - 110 mg/dL    BUN 7 6 - 20 mg/dL    Creatinine 0.8 0.5 - 1.4 mg/dL    Calcium 9.3 8.7 - 10.5 mg/dL    Total Protein 6.9 6.0 - 8.4 g/dL    Albumin 3.8 3.5 - 5.2 g/dL    Total Bilirubin 0.4 0.1 - 1.0 mg/dL    Alkaline Phosphatase 65 55 - 135 U/L    AST 24 10 - 40 U/L    ALT 14 10 - 44 U/L    eGFR >60.0 >60 mL/min/1.73 m^2    Anion Gap 10 8 - 16 mmol/L   Lactic acid, plasma    Collection Time: 11/03/23 11:18 PM   Result Value Ref Range    Lactate (Lactic Acid) 1.4 0.5 - 2.2 mmol/L   Magnesium    Collection Time: 11/03/23 11:18 PM   Result Value Ref Range    Magnesium 1.6 1.6 - 2.6 mg/dL   TSH    Collection Time: 11/03/23 11:18 PM   Result Value Ref Range    TSH 0.787 0.400 - 4.000 uIU/mL   CK    Collection Time: 11/03/23 11:18 PM   Result Value Ref Range     20 - 180 U/L   Ethanol    Collection Time: 11/03/23 11:18 PM   Result Value Ref Range    Alcohol, Serum <10 0 - 10 mg/dL   Drug screen panel, emergency    Collection Time: 11/03/23 11:58 PM   Result Value Ref Range    Benzodiazepines Negative Negative    Methadone metabolites Negative Negative    Cocaine (Metab.) Negative Negative    Opiate Scrn, Ur Negative Negative    Barbiturate Screen, Ur Negative Negative    Amphetamine Screen, Ur Negative  Negative    THC Negative Negative    Phencyclidine Negative Negative    Creatinine, Urine 29.8 15.0 - 325.0 mg/dL    Toxicology Information SEE COMMENT    Urinalysis, Reflex to Urine Culture Urine, Clean Catch    Collection Time: 11/03/23 11:58 PM    Specimen: Urine, Clean Catch   Result Value Ref Range    Specimen UA Urine, Clean Catch     Color, UA Yellow Yellow, Straw, Kerri    Appearance, UA Clear Clear    pH, UA >8.0 (A) 5.0 - 8.0    Specific Gravity, UA 1.020 1.005 - 1.030    Protein, UA Negative Negative    Glucose, UA Negative Negative    Ketones, UA Negative Negative    Bilirubin (UA) Negative Negative    Occult Blood UA Negative Negative    Nitrite, UA Negative Negative    Urobilinogen, UA Negative Negative EU/dL    Leukocytes, UA Negative Negative   Pregnancy, urine rapid    Collection Time: 11/03/23 11:58 PM   Result Value Ref Range    Preg Test, Ur Negative    D-Dimer, Quantitative    Collection Time: 11/04/23  4:55 AM   Result Value Ref Range    D-Dimer 0.28 <0.50 mg/L FEU         Significant Imaging: I have reviewed all pertinent imaging results/findings within the past 24 hours.

## 2023-11-04 NOTE — ED NOTES
New iv placed to left hand. Jelco intact. Secured with tape and coban. Tolerated well. Blood specimens obtained.

## 2023-11-04 NOTE — ASSESSMENT & PLAN NOTE
She is behaving very much like a medication or drug overdose, but UDS is normal  Check HIV, RPR, NH3, ESR, CRP  She has been on the Suboxone and the Remeron for 3 months with no issues  The only med that she started, stopped, then restarted a week ago is Trileptal  She has been on the Trileptal for over a week with no problems as well  Her WBC is mildly elevated, but no signs of infection  There is the question of possibly having had  HSV in the past due to an ulcer on labia, but HSV was negative then   -HSV meningitis or encephalitis seems unlikely with such acute onset  Tiara in Bipolar could be another possibility  Consider MRI brain, though she would likely need sedation to hold still at this point  Check PETH to see if she has been drinking, as withdrawal can do this.  Remeron + Trileptal can cause some confusion, but this seems excessive for that combo  She is very lethargic at the moment... but also got Ativan and Zyprexa for agitation in the ED  Will try to minimize psychotropic meds for the moment until Psychiatry can weigh in  Neuro checks Q4

## 2023-11-04 NOTE — ASSESSMENT & PLAN NOTE
She is not on a true anti-psychotic agent, dopamine-blocker, so an odd dystonic reaction seems less likely  She does have rapid arm and leg flailing  Hopefully if this is medication induced (or drug) it will wear off soon  Tele psych consult

## 2023-11-04 NOTE — ED PROVIDER NOTES
Encounter Date: 11/3/2023       History     Chief Complaint   Patient presents with    Spasms     Pt here with c/o spasms all over and feeling sob and agitated onset tonight. She denies drug use as she is in drug rehab. No new meds. No recent illness. No tick bites. No HA. No fever. She denies recent puncture wound/injury. Sister says this unusual for patient and she no longer uses drugs. She says pt has not taken any meds other than her suboxone.    The history is provided by the patient.     Review of patient's allergies indicates:   Allergen Reactions    Grass pollen-orchardgrass, standard Rash     Past Medical History:   Diagnosis Date    Anxiety and depression     Appendicitis     Asthma     Bipolar disorder, unspecified     Dental caries     Necrotic teeth and periapical abscesses in past    Iron deficiency anemia due to chronic blood loss 07/19/2019    Oth psychoactive substance abuse w anxiety disorder     Postpartum endometritis 07/19/2019     Past Surgical History:   Procedure Laterality Date    HYSTEROSCOPY WITH DILATION AND CURETTAGE OF UTERUS N/A 07/19/2019    Procedure: HYSTEROSCOPY, WITH DILATION AND CURETTAGE OF UTERUS;  Surgeon: Cody Payne MD;  Location: [facility] OR;  Service: OB/GYN;  Laterality: N/A;  Insertion of transdermal device    LAPAROSCOPIC APPENDECTOMY N/A 01/06/2022    Procedure: APPENDECTOMY, LAPAROSCOPIC;  Surgeon: Maria Guadalupe Askew MD;  Location: [facility] OR;  Service: General;  Laterality: N/A;     Family History   Problem Relation Age of Onset    Asthma Mother     Hypertension Father     Breast cancer Maternal Grandmother      Social History     Tobacco Use    Smoking status: Every Day     Current packs/day: 0.50     Types: Cigarettes    Smokeless tobacco: Never   Substance Use Topics    Alcohol use: Not Currently     Comment: rarely    Drug use: Never     Review of Systems   Constitutional:  Positive for diaphoresis.   Respiratory:  Positive for shortness of breath.    Endocrine:  Negative for cold intolerance, heat intolerance, polydipsia, polyphagia and polyuria.   Musculoskeletal:  Positive for myalgias.   Psychiatric/Behavioral:  The patient is nervous/anxious.    All other systems reviewed and are negative.      Physical Exam     Initial Vitals   BP Pulse Resp Temp SpO2   11/03/23 2252 11/03/23 2252 11/03/23 2252 11/03/23 2256 11/03/23 2256   (!) 146/79 94 18 98 °F (36.7 °C) 98 %      MAP       --                Physical Exam    Nursing note and vitals reviewed.  Constitutional: She appears well-developed and well-nourished. She is diaphoretic. No distress.   Bizarre affect.    HENT:   Head: Normocephalic and atraumatic.   Mouth/Throat: Oropharynx is clear and moist.   Eyes: Conjunctivae and EOM are normal. Pupils are equal, round, and reactive to light. No scleral icterus.   Neck: Neck supple. No thyromegaly present. No Brudzinski's sign and no Kernig's sign noted.   Normal range of motion.   Full passive range of motion without pain.     Cardiovascular:  Regular rhythm, normal heart sounds and intact distal pulses.           tachy   Pulmonary/Chest: Breath sounds normal.   Abdominal: Abdomen is soft. There is no abdominal tenderness.   Musculoskeletal:         General: No tenderness or edema. Normal range of motion.      Cervical back: Full passive range of motion without pain, normal range of motion and neck supple. No rigidity. No spinous process tenderness or muscular tenderness.     Neurological: She is alert and oriented to person, place, and time. She has normal strength. No cranial nerve deficit or sensory deficit. GCS score is 15. GCS eye subscore is 4. GCS verbal subscore is 5. GCS motor subscore is 6.   Skin: Skin is warm. Capillary refill takes less than 2 seconds. No rash noted. No erythema. No pallor.   Psychiatric: Her speech is normal. Thought content normal. Her mood appears anxious. She is hyperactive. She is not actively hallucinating. Cognition and memory are normal.  She is attentive.         ED Course   Procedures  Labs Reviewed   CBC W/ AUTO DIFFERENTIAL - Abnormal; Notable for the following components:       Result Value    WBC 14.69 (*)     Gran # (ANC) 10.2 (*)     Immature Grans (Abs) 0.06 (*)     Mono # 1.2 (*)     All other components within normal limits   URINALYSIS, REFLEX TO URINE CULTURE - Abnormal; Notable for the following components:    pH, UA >8.0 (*)     All other components within normal limits    Narrative:     Preferred Collection Type->Urine, Clean Catch  Specimen Source->Urine   CULTURE, BLOOD   CULTURE, BLOOD   COMPREHENSIVE METABOLIC PANEL   LACTIC ACID, PLASMA   MAGNESIUM   DRUG SCREEN PANEL, URINE EMERGENCY    Narrative:     Preferred Collection Type->Urine, Clean Catch  Specimen Source->Urine   TSH   PREGNANCY TEST, URINE RAPID    Narrative:     Specimen Source->Urine   CK   CK   ALCOHOL,MEDICAL (ETHANOL)   ALCOHOL,MEDICAL (ETHANOL)   D DIMER, QUANTITATIVE     EKG Readings: (Independently Interpreted)   Rhythm: Normal Sinus Rhythm. Heart Rate: 94. Ectopy: No Ectopy. Conduction: Normal. ST Segments: Normal ST Segments. T Waves Flipped: I, AVL, V1 and V2. Axis: Right Axis Deviation. Clinical Impression: Normal Sinus Rhythm Other Impression: with T wave abn and extreme RAD. No old for comparison.       Imaging Results              X-Ray Chest AP Portable (In process)  Result time 11/04/23 04:25:24                     CT Head Without Contrast (In process)                      Medications   sodium chloride 0.9% bolus 1,000 mL 1,000 mL (0 mLs Intravenous Stopped 11/4/23 0018)   LORazepam injection 2 mg (2 mg Intravenous Given 11/3/23 2314)   sodium chloride 0.9% bolus 1,000 mL 1,000 mL (1,000 mLs Intravenous New Bag 11/4/23 0152)   LORazepam injection 2 mg (2 mg Intravenous Given 11/4/23 0152)   OLANZapine injection 10 mg (10 mg Intramuscular Given 11/4/23 030)     Medical Decision Making  Pt presented with restlessness and feeling sob. Anxious appearing  with normal VS and clear lungs. Sympathomimetic toxicity suspected given her drug abuse hx but UDS neg. ETOH neg. She was given a total of 4mg ativan with minimal improvement. CT head neg. Ddx considered: drug abuse, delirium, serotonin syndrome, sarath, panic attack, NMS, anticholinergic toxicity and hyperthyroidism/thyrotoxicosis. Pt without hyperreflexia or clonus and no hx of culprit medications. NMS also unlikely 2/2 nolonger taking psych meds and no lead pipe rigidity. UDS neg. CBC showed mild leukocytosis of 14k. Neg UA and UPT. TSH normal. CMP, lactic acid and Mg also normal. CPK and ETOH normal. Pt received total of 2L NS for volume depletion. No hx of anticholinergic meds. Zyprexa given as she was pulling out her IV and trying to get out of bed.     Amount and/or Complexity of Data Reviewed  Labs: ordered. Decision-making details documented in ED Course.  Radiology: ordered. Decision-making details documented in ED Course.  ECG/medicine tests: ordered and independent interpretation performed. Decision-making details documented in ED Course.    Critical Care  Total time providing critical care: 45 minutes               ED Course as of 11/04/23 0448   Sat Nov 04, 2023   0058 Ativan given.  [DC]   0140 Feels better. Still with bizarre affect and restlessness.  [DC]   0255 Pt still restless despite total of 4mg ativan.  [DC]   0255 CT head neg per [facility]rad[facility] [DC]      ED Course User Index  [DC] Tyson Ko Jr., MD                    Clinical Impression:   Final diagnoses:  [G25.71] Akathisia        ED Disposition Condition    Observation Stable                Tyson Ko Jr., MD  11/04/23 0448

## 2023-11-04 NOTE — H&P
[facility]  History & Physical    Patient Name: Felicitas Santos  MRN: [id number]  Admission Date: 11/3/2023  Attending Physician: JESSIE Weber MD   Primary Care Provider: Leeann, Primary Doctor         Patient information was obtained from relative(s), past medical records and ER records.       Subjective:     Principal Problem:Delirium    Chief Complaint:   Chief Complaint   Patient presents with    Spasms        HPI:   Ms. Santos is a 31yo lady with a past medical history of asthma, Fe deficiency anemia, opioid abuse, and bipolar disorder.  Ms. Santos is very confused and agitated on my interview, unable to provide any meaningful history.  Luckily, her sister, Brandy, is at the bedside and reports the last 24 hours events.  Brandy tells me that Ms. Santos became addicted to pain pills just shy of a year ago.  She checked herself into a local rehab facility, [facility].      After her stint inpatient, she has been completely clean for the past 8 months.  Apparently 3 months ago her psychiatry doctor from the rehab facility started her on Remeron, Suboxone and Trileptal (Oxycarbazepine).  She took all the meds for a month, then ran out of only the Trileptal.  After her doctor realized this, she called this back in, which she began taking again just recently 1 week ago.  She never stopped the Suboxone or Remeron as far as her sister is aware.    She had been doing well up until last night around 9pm when she suddenly developed agitation, flailing of her limbs, jerking movements and muscle spasms.  Her sister states she was lucid and conversant at first and told her she just could not control the movements.  They finally decided to bring her to the hospital.  Right as she arrived, Brandy then began to notice concurrent confusion and strange behavior from a mental standpoint.  She is unable to tell me if she has been taking any OTC meds to excess, such as  "benadryl, Tylenol or Robitussin SM.      In the ED her VS were BP (!) 146/79 (BP Location: Left arm, Patient Position: Lying)   Pulse 108   Temp 98 °F (36.7 °C) (Oral)   Resp 18   Ht 5' 7" (1.702 m)   Wt 81.6 kg (180 lb)   LMP 10/12/2023 (Approximate)   SpO2 98%   Breastfeeding No   BMI 28.19 kg/m².  Labs showed WBC 14.7, Cr 0.8, NML CMP, , LA 1.4, TSH 0.79, UPT neg, ETOH < 10, UDS negative, UA NML.    CXR shows no infiltrates and borderline enlarged cardiac silhouette.  CT head without contrast showed no evidence of intracranial hemorrhage.  EKG showed NSR rate 94, NS ST-T changes (flipped T waves anterolateral), QTc 477 ms.    In the ED she was treated with:  Medications   sodium chloride 0.9% bolus 1,000 mL 1,000 mL (0 mLs Intravenous Stopped 11/4/23 0018)   LORazepam injection 2 mg (2 mg Intravenous Given 11/3/23 2314)   sodium chloride 0.9% bolus 1,000 mL 1,000 mL (1,000 mLs Intravenous New Bag 11/4/23 0152)   LORazepam injection 2 mg (2 mg Intravenous Given 11/4/23 0152)   OLANZapine injection 10 mg (10 mg Intramuscular Given 11/4/23 0304)               Past Medical History:   Diagnosis Date    Anxiety and depression     Appendicitis     Asthma     Bipolar disorder, unspecified     Dental caries     Necrotic teeth and periapical abscesses in past    Iron deficiency anemia due to chronic blood loss 07/19/2019    Opiate addiction     Denies ever using IV drugs, just "pills"    Postpartum endometritis 07/19/2019       Past Surgical History:   Procedure Laterality Date    HYSTEROSCOPY WITH DILATION AND CURETTAGE OF UTERUS N/A 07/19/2019    Procedure: HYSTEROSCOPY, WITH DILATION AND CURETTAGE OF UTERUS;  Surgeon: Cody Payne MD;  Location: [facility] OR;  Service: OB/GYN;  Laterality: N/A;  Insertion of transdermal device    LAPAROSCOPIC APPENDECTOMY N/A 01/06/2022    Procedure: APPENDECTOMY, LAPAROSCOPIC;  Surgeon: Maria Guadalupe Askew MD;  Location: [facility] OR;  Service: General;  Laterality: N/A; "       Review of patient's allergies indicates:   Allergen Reactions    Grass pollen-orchardgrass, standard Rash       No current facility-administered medications on file prior to encounter.     Current Outpatient Medications on File Prior to Encounter   Medication Sig    acyclovir (ZOVIRAX) 400 MG tablet Take 1 tablet (400 mg total) by mouth 3 (three) times daily. for 10 days    buprenorphine-naloxone 8-2 mg (SUBOXONE) 8-2 mg Place 1 Film under the tongue 2 (two) times daily.    OXcarbazepine (TRILEPTAL) 300 MG Tab Take 300 mg by mouth 3 (three) times daily.     Family History       Problem Relation (Age of Onset)    Asthma Mother    Breast cancer Maternal Grandmother    Hypertension Father          Tobacco Use    Smoking status: Every Day     Current packs/day: 0.50     Types: Cigarettes    Smokeless tobacco: Never   Substance and Sexual Activity    Alcohol use: Not Currently     Comment: rarely    Drug use: Never    Sexual activity: Yes     Partners: Male     Review of Systems   Unable to perform ROS: Mental status change     Objective:     Vital Signs (Most Recent):  Temp: 98.4 °F (36.9 °C) (11/04/23 0456)  Pulse: 101 (11/04/23 0456)  Resp: 19 (11/04/23 0456)  BP: (!) 141/70 (11/04/23 0456)  SpO2: 97 % (11/04/23 0456) Vital Signs (24h Range):  Temp:  [98 °F (36.7 °C)-98.4 °F (36.9 °C)] 98.4 °F (36.9 °C)  Pulse:  [] 101  Resp:  [18-19] 19  SpO2:  [97 %-98 %] 97 %  BP: (141-146)/(70-79) 141/70     Weight: 81.6 kg (180 lb)  Body mass index is 28.19 kg/m².     Physical Exam  Constitutional:       Appearance: She is overweight. She is not ill-appearing, toxic-appearing or diaphoretic.   HENT:      Head: Normocephalic and atraumatic.   Pulmonary:      Effort: No tachypnea or bradypnea.   Genitourinary:     Comments: No lester in place  Neurological:      Mental Status: She is lethargic and disoriented.      Comments: Ms. Santos is unable to remain still on the gurney and has flailing of her extremities at times  and periods of shaking her head side to side while mumbling, then suddenly sitting up and raising her legs in the air then appearing to try to climb out of the bed.  She is easily redirected by her sister.   Psychiatric:         Attention and Perception: She is inattentive.         Mood and Affect: Mood is anxious. Affect is labile.         Speech: She is noncommunicative. Speech is slurred.         Behavior: Behavior is uncooperative and agitated.         Cognition and Memory: Cognition is impaired. Memory is impaired. She exhibits impaired recent memory and impaired remote memory.                Significant Labs: All pertinent labs within the past 24 hours have been reviewed.  Recent Results (from the past 24 hour(s))   Complete Blood Count (CBC)    Collection Time: 11/03/23 11:18 PM   Result Value Ref Range    WBC 14.69 (H) 3.90 - 12.70 K/uL    RBC 4.32 4.00 - 5.40 M/uL    Hemoglobin 13.1 12.0 - 16.0 g/dL    Hematocrit 38.2 37.0 - 48.5 %    MCV 88 82 - 98 fL    MCH 30.3 27.0 - 31.0 pg    MCHC 34.3 32.0 - 36.0 g/dL    RDW 13.3 11.5 - 14.5 %    Platelets 228 150 - 450 K/uL    MPV 9.9 9.2 - 12.9 fL    Immature Granulocytes 0.4 0.0 - 0.5 %    Gran # (ANC) 10.2 (H) 1.8 - 7.7 K/uL    Immature Grans (Abs) 0.06 (H) 0.00 - 0.04 K/uL    Lymph # 3.1 1.0 - 4.8 K/uL    Mono # 1.2 (H) 0.3 - 1.0 K/uL    Eos # 0.2 0.0 - 0.5 K/uL    Baso # 0.04 0.00 - 0.20 K/uL    nRBC 0 0 /100 WBC    Gran % 69.1 38.0 - 73.0 %    Lymph % 21.2 18.0 - 48.0 %    Mono % 7.9 4.0 - 15.0 %    Eosinophil % 1.1 0.0 - 8.0 %    Basophil % 0.3 0.0 - 1.9 %    Differential Method Automated    Comprehensive Metabolic Panel (CMP)    Collection Time: 11/03/23 11:18 PM   Result Value Ref Range    Sodium 136 136 - 145 mmol/L    Potassium 4.0 3.5 - 5.1 mmol/L    Chloride 103 95 - 110 mmol/L    CO2 23 23 - 29 mmol/L    Glucose 100 70 - 110 mg/dL    BUN 7 6 - 20 mg/dL    Creatinine 0.8 0.5 - 1.4 mg/dL    Calcium 9.3 8.7 - 10.5 mg/dL    Total Protein 6.9 6.0 - 8.4 g/dL     Albumin 3.8 3.5 - 5.2 g/dL    Total Bilirubin 0.4 0.1 - 1.0 mg/dL    Alkaline Phosphatase 65 55 - 135 U/L    AST 24 10 - 40 U/L    ALT 14 10 - 44 U/L    eGFR >60.0 >60 mL/min/1.73 m^2    Anion Gap 10 8 - 16 mmol/L   Lactic acid, plasma    Collection Time: 11/03/23 11:18 PM   Result Value Ref Range    Lactate (Lactic Acid) 1.4 0.5 - 2.2 mmol/L   Magnesium    Collection Time: 11/03/23 11:18 PM   Result Value Ref Range    Magnesium 1.6 1.6 - 2.6 mg/dL   TSH    Collection Time: 11/03/23 11:18 PM   Result Value Ref Range    TSH 0.787 0.400 - 4.000 uIU/mL   CK    Collection Time: 11/03/23 11:18 PM   Result Value Ref Range     20 - 180 U/L   Ethanol    Collection Time: 11/03/23 11:18 PM   Result Value Ref Range    Alcohol, Serum <10 0 - 10 mg/dL   Drug screen panel, emergency    Collection Time: 11/03/23 11:58 PM   Result Value Ref Range    Benzodiazepines Negative Negative    Methadone metabolites Negative Negative    Cocaine (Metab.) Negative Negative    Opiate Scrn, Ur Negative Negative    Barbiturate Screen, Ur Negative Negative    Amphetamine Screen, Ur Negative Negative    THC Negative Negative    Phencyclidine Negative Negative    Creatinine, Urine 29.8 15.0 - 325.0 mg/dL    Toxicology Information SEE COMMENT    Urinalysis, Reflex to Urine Culture Urine, Clean Catch    Collection Time: 11/03/23 11:58 PM    Specimen: Urine, Clean Catch   Result Value Ref Range    Specimen UA Urine, Clean Catch     Color, UA Yellow Yellow, Straw, Kerri    Appearance, UA Clear Clear    pH, UA >8.0 (A) 5.0 - 8.0    Specific Gravity, UA 1.020 1.005 - 1.030    Protein, UA Negative Negative    Glucose, UA Negative Negative    Ketones, UA Negative Negative    Bilirubin (UA) Negative Negative    Occult Blood UA Negative Negative    Nitrite, UA Negative Negative    Urobilinogen, UA Negative Negative EU/dL    Leukocytes, UA Negative Negative   Pregnancy, urine rapid    Collection Time: 11/03/23 11:58 PM   Result Value Ref Range     Preg Test, Ur Negative    D-Dimer, Quantitative    Collection Time: 11/04/23  4:55 AM   Result Value Ref Range    D-Dimer 0.28 <0.50 mg/L FEU         Significant Imaging: I have reviewed all pertinent imaging results/findings within the past 24 hours.        Assessment/Plan:     * Delirium  She is behaving very much like a medication or drug overdose, but UDS is normal  Check HIV, RPR, NH3, ESR, CRP  She has been on the Suboxone and the Remeron for 3 months with no issues  The only med that she started, stopped, then restarted a week ago is Trileptal  She has been on the Trileptal for over a week with no problems as well  Her WBC is mildly elevated, but no signs of infection  There is the question of possibly having had  HSV in the past due to an ulcer on labia, but HSV was negative then   -HSV meningitis or encephalitis seems unlikely with such acute onset  Tiara in Bipolar could be another possibility  Consider MRI brain, though she would likely need sedation to hold still at this point  Check PETH to see if she has been drinking, as withdrawal can do this.  Remeron + Trileptal can cause some confusion, but this seems excessive for that combo  She is very lethargic at the moment... but also got Ativan and Zyprexa for agitation in the ED  Will try to minimize psychotropic meds for the moment until Psychiatry can weigh in  Neuro checks Q4      Akathisia  She is not on a true anti-psychotic agent, dopamine-blocker, so an odd dystonic reaction seems less likely  She does have rapid arm and leg flailing  Hopefully if this is medication induced (or drug) it will wear off soon  Tele psych consult      Opioid dependence in remission  Holding Suboxone    Ms. Santos became addicted to pain pills just shy of a year ago.  She checked herself into a local rehab facility, [facility].  After her stint inpatient, she has been completely clean for the past 8 months.  Apparently 3 months ago her psychiatry  "doctor from the rehab facility started her on Remeron, Suboxone and Trileptal (Oxycarbazepine).  She took all the meds for a month, then ran out of only the Trileptal.  After her doctor realized this, she called this back in, which she began taking again just recently 1 week ago.  She never stopped the Suboxone or Remeron as far as her sister is aware.      Psychiatric illness  I'm not sure what her true underlying psychiatric illness is  Some have anxiety and depression listed, others have bipolar - I cannot confirm any of these  Holding home meds PO for now until worked out    "In two studies, the effects of the potentially interacting drugs mirtazapine and carbamazepine on their pharmacokinetics have been investigated. Subjects were treated with carbamazepine combined with placebo for 21 days and subsequently with carbamazepine combined with mirtazapine for another 7 days (Study A) or with mirtazapine combined with placebo for 7 days and subsequently mirtazapine combined with carbamazepine for another 21 days (Study B). Pharmacokinetic results indicate that carbamazepine decreased significantly AUC and Cmax values for mirtazapine and increased values for demethyl-mirtazapine Cmax. Mirtazapine had no effect on carbamazepine pharmacokinetic parameters, but did lower carbamazepine-10,11-epoxide levels. Mirtazapine did not affect the single dose kinetics or the enzyme inducing properties of carbamazepine. VAMRS alertness scores reflected the somnolence-inducing effects of carbamazepine and mirtazapine and psychometric test results revealed impairment of specific tasks. The combination was safe and routine laboratory testing did not reveal clinically relevant abnormalities." - Drug-drug interaction studies with mirtazapine and carbamazepine in healthy male subjects.  Eur J Drug Metab Pharmacokinet  . 2001 Jan-Jun;26(1-2):109-21. doi: 10.1007/WC48611097.      Leukocytosis  No fever, clean UA and CXR  I cannot find a " source of infection to explain this  Perhaps stress de-margination?    Ordered ESR, CRP, Procalcitonin  Dr. Ko notes no signs of meningitis on exam  If worsens or fever, consider LP      Moderate cigarette smoker  She smokes 1/2 PPD  Will need counseling when lucid  Duonebs prn        VTE Risk Mitigation (From admission, onward)           Ordered     Place BRANDI hose  Until discontinued         11/04/23 0537     IP VTE LOW RISK PATIENT  Once         11/04/23 0537     Place sequential compression device  Until discontinued         11/04/23 0537                         The attending portion of this evaluation, treatment, and documentation was performed per DEV Weber MD via Telemedicine AudioVisual using the secure [email or web address] software platform with 2 way audio/video. The provider was located off-site and the patient is located in the hospital. The aforementioned video software was utilized to document the relevant history and physical exam    On 11/04/2023, patient should be placed in hospital observation services under my care.         DEV Weber MD  Department of Hospital Medicine   [place] - Emergency Dept

## 2023-11-04 NOTE — ED NOTES
Arrived in room to find patient had pulled IV to right AC from thrashing around. Dressing applied to site. Blood drawn per new order from MD and blue top obtained. Patient continues disoriented and spasm throughout body. Sister at bedside. Again asked about illicit drug use and she denies and states recently started back on psyche medications and believes this could be the cause. Patient has ripped off all of the EKG leads and spilled water on the floor. Cleaned floor with dry towels and instructed sister to watch closely and notify if she attempts to get out of bed.

## 2023-11-04 NOTE — PLAN OF CARE
Problem: Adult Inpatient Plan of Care  Goal: Plan of Care Review  Outcome: Ongoing, Progressing  Goal: Patient-Specific Goal (Individualized)  Outcome: Ongoing, Progressing  Goal: Absence of Hospital-Acquired Illness or Injury  Outcome: Ongoing, Progressing  Goal: Optimal Comfort and Wellbeing  Outcome: Ongoing, Progressing  Goal: Readiness for Transition of Care  Outcome: Ongoing, Progressing     Problem: Infection  Goal: Absence of Infection Signs and Symptoms  Outcome: Ongoing, Progressing     Problem: Gas Exchange Impaired  Goal: Optimal Gas Exchange  Outcome: Ongoing, Progressing     Problem: Skin Injury Risk Increased  Goal: Skin Health and Integrity  Outcome: Ongoing, Progressing

## 2023-11-04 NOTE — ASSESSMENT & PLAN NOTE
Holding Suboxone    Ms. Santos became addicted to pain pills just shy of a year ago.  She checked herself into a local rehab facility, [facility].  After her stint inpatient, she has been completely clean for the past 8 months.  Apparently 3 months ago her psychiatry doctor from the rehab facility started her on Remeron, Suboxone and Trileptal (Oxycarbazepine).  She took all the meds for a month, then ran out of only the Trileptal.  After her doctor realized this, she called this back in, which she began taking again just recently 1 week ago.  She never stopped the Suboxone or Remeron as far as her sister is aware.

## 2023-11-04 NOTE — CONSULTS
[facility]  Psychiatry  Telepsychiatry Consult Note    Please see previous notes:    Patient agreeable to consultation via telepsychiatry.    Tele-Consultation from Psychiatry started: 11/4/2023 at 6:43pm  The chief complaint leading to psychiatric consultation is: delirium.  This consultation was requested by Dr. Weber, the Emergency Department attending physician.  The location of the consulting psychiatrist is  [place] .  The patient location is  [facility] ICU   The patient arrived at the ED at: 11/3 11:09pm    Also present with the patient at the time of the consultation: patient's mother.     Patient Identification:   Felicitas Santos is a 32 y.o. female.    Patient information was obtained from parent, past medical records, ER records, and primary team.  Patient presented voluntarily to the Emergency Department by private vehicle.    Consults  Consult Start Time: 11/04/2023 18:42 CDT  Consult End Time: 11/04/2023 19:55 CDT      Subjective:     History of Present Illness:    Per ED MD:  Ms. Santos is a 33yo lady with a past medical history of asthma, Fe deficiency anemia, opioid abuse, and bipolar disorder.  Ms. Santos is very confused and agitated on my interview, unable to provide any meaningful history.  Luckily, her sister, Brandy, is at the bedside and reports the last 24 hours events.  Brandy tells me that Ms. Santos became addicted to pain pills just shy of a year ago.  She checked herself into a local rehab facility, [facility].       After her stint inpatient, she has been completely clean for the past 8 months.  Apparently 3 months ago her psychiatry doctor from the rehab facility started her on Remeron, Suboxone and Trileptal (Oxycarbazepine).  She took all the meds for a month, then ran out of only the Trileptal.  After her doctor realized this, she called this back in, which she began taking again just recently 1 week ago.  She never stopped the Suboxone or Remeron  as far as her sister is aware.     She had been doing well up until last night around 9pm when she suddenly developed agitation, flailing of her limbs, jerking movements and muscle spasms.  Her sister states she was lucid and conversant at first and told her she just could not control the movements.  They finally decided to bring her to the hospital.  Right as she arrived, Brandy then began to notice concurrent confusion and strange behavior from a mental standpoint.  She is unable to tell me if she has been taking any OTC meds to excess, such as benadryl, Tylenol or Robitussin SM.     Psych Interview  Pt seen via telehealth, mom at bedside. Pt reports she is feeling better than she was earlier today. Says she isn't really sure what happened that lead to her coming to the ED. Was at home last night after work, laying down reading her child a book, was about to fall asleep and then felt like entire body tensed up, felt like she had to stretch her whole body, extremely painful and scary. Felt like she had no control over what was happening. Not sure how long it lasted, nor is mom, as patient's sister is who brought her to the ED. Has never had anything like this happen before. 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 Mom reports that she did patient's job today while she was out, and just one day of it has her exhausted.     Multiple stressors, working every day, has 2 children at home,   just over a year ago. Mom thinks she is exhausted. Has been sober from substances for 165 days, getting treatment through [facility], doctor and counselor through that program.     Reports that she feels like she cannot get enough sleep, gets home from work and goes to sleep until kids get home, then will do homework with them, goes back to bed at 7pm, then up at 4am for work. Reports that she has been eating alright.     "  Reports she has been on current med regien for a few months and finds them very helpful, very much would like to restart meds. Remeron helpful for sleep. Has not had suboxone today, is starting to have w/d symptoms (anxiety, clammy). Also taking oxcarbazepine. Reports she has been on "a fuck ton of other shit" for bipolar, anxiety, depression. Can't remember past meds. Mood recently has been sad. Misses her . They were together since she was 17. He had been sick for 7 years before passing. Reports after he , she was doing "everything" with regards to substances, just wanted to die. Yovani she was seeing got mad at her, called CPS, which lead her to getting involved in drug court. Says she would still like to see her  again, but wouldn't end her life because of her children. Mom, extended family all very supportive. Sees counselor once a week.     Pt reports that she is feeling a lot better compared to when she came into the ED. Doesn't currently feel confused or cloudy. Agreeable to plan to restart home meds, continue medical workup.     Spoke with RN after assessment, who notes that patient has made a 180 degree turn for the better compared to this morning, when she was agitated, confused, odd muscle tension.        , [place]    Psychiatric History:   Previous Psychiatric Hospitalizations: No Denies inpatient stays  Previous Medication Trials: Yes Reports "a shit ton" of med trials for "bipolar, depression, anxiety"  Previous Suicide Attempts: no Denies  History of Violence: Denies  History of Depression: Yes  History of Tiara: Reports a history of bipolar diagnosis, but not clear tiara  History of Auditory/Visual Hallucination Denies  History of Delusions: Denies  Outpatient psychiatrist (current & past): Yes Through court    Substance Abuse History:  Tobacco:Yes  Alcohol: Not currently  Illicit Substances:165 days sober, reports that she had been using "everything but weed", " "  Detox/Rehab: Was recommended to do inpatient detox but did it at home    Legal History: Past charges/incarcerations: Deferred     Family Psychiatric History: Non contributory      Social History:  Developmental/Childhood: Deferred  *Education:High School Diploma  Employment Status/Finances:Employed   Relationship Status/Sexual Orientation:    Children: 2  Housing Status: Home    history:  NO  Access to gun: NO  Druze: Deferred  Recreational activities:Time with family    Psychiatric Mental Status Exam:  Arousal: alert  Sensorium/Orientation: oriented to grossly intact, person, place, situation, time/date, day of week, month of year, year, Oriented to November, Saturday, 2023, BSL  Behavior/Cooperation: normal, cooperative   Speech: normal tone, normal rate, normal pitch, normal volume  Language: grossly intact  Mood: " I'm sad   "   Affect: appropriate and depressed  Thought Process: normal and logical  Thought Content:   Auditory hallucinations: NO  Visual hallucinations: NO  Paranoia: NO  Delusions:  NO  Suicidal ideation: NO  Homicidal ideation: NO  Attention/Concentration:  Intact, spells EARTH forward and backward  Memory:    Recent:  Intact   Remote: Intact  Fund of Knowledge: Intact   Insight: intact, has awareness of illness  Judgment: behavior is adequate to circumstances      Past Medical History:   Past Medical History:   Diagnosis Date    Anxiety and depression     Appendicitis     Asthma     Bipolar disorder, unspecified     Dental caries     Necrotic teeth and periapical abscesses in past    Iron deficiency anemia due to chronic blood loss 07/19/2019    Opiate addiction     Denies ever using IV drugs, just "pills"    Postpartum endometritis 07/19/2019      Laboratory Data:   Labs Reviewed   CBC W/ AUTO DIFFERENTIAL - Abnormal; Notable for the following components:       Result Value    WBC 14.69 (*)     Gran # (ANC) 10.2 (*)     Immature Grans (Abs) 0.06 (*)     Mono # 1.2 " (*)     All other components within normal limits   URINALYSIS, REFLEX TO URINE CULTURE - Abnormal; Notable for the following components:    pH, UA >8.0 (*)     All other components within normal limits    Narrative:     Preferred Collection Type->Urine, Clean Catch  Specimen Source->Urine   C-REACTIVE PROTEIN - Abnormal; Notable for the following components:    CRP 45.9 (*)     All other components within normal limits    Narrative:     Release to patient->Immediate   COMPREHENSIVE METABOLIC PANEL   LACTIC ACID, PLASMA   MAGNESIUM   DRUG SCREEN PANEL, URINE EMERGENCY    Narrative:     Preferred Collection Type->Urine, Clean Catch  Specimen Source->Urine   TSH   PREGNANCY TEST, URINE RAPID    Narrative:     Specimen Source->Urine   CK   CK   ALCOHOL,MEDICAL (ETHANOL)   ALCOHOL,MEDICAL (ETHANOL)   D DIMER, QUANTITATIVE   D DIMER, QUANTITATIVE   AMMONIA    Narrative:     Release to patient->Immediate       Neurological History:  Seizures: No  Head trauma: No    Allergies: NKDA  Review of patient's allergies indicates:   Allergen Reactions    Grass pollen-orchardgrass, standard Rash       Medications in ER:   Medications   sodium chloride 0.9% flush 10 mL (has no administration in time range)   naloxone 0.4 mg/mL injection 0.02 mg (has no administration in time range)   glucose chewable tablet 16 g (has no administration in time range)   glucose chewable tablet 24 g (has no administration in time range)   glucagon (human recombinant) injection 1 mg (has no administration in time range)   ondansetron injection 4 mg (has no administration in time range)   prochlorperazine injection Soln 5 mg (has no administration in time range)   albuterol-ipratropium 2.5 mg-0.5 mg/3 mL nebulizer solution 3 mL (has no administration in time range)   dextrose 10% bolus 125 mL 125 mL (has no administration in time range)   dextrose 10% bolus 250 mL 250 mL (has no administration in time range)   nicotine 21 mg/24 hr 1 patch (1 patch  Transdermal Patch Applied 11/4/23 1800)   sodium chloride 0.9% bolus 1,000 mL 1,000 mL (0 mLs Intravenous Stopped 11/4/23 0018)   LORazepam injection 2 mg (2 mg Intravenous Given 11/3/23 2314)   sodium chloride 0.9% bolus 1,000 mL 1,000 mL (0 mLs Intravenous Stopped 11/4/23 0452)   LORazepam injection 2 mg (2 mg Intravenous Given 11/4/23 0152)   OLANZapine injection 10 mg (10 mg Intramuscular Given 11/4/23 0309)       Medications at home: Suboxone 8/2mg BID (verified in )  Mirtazapine 15mg qhs  Oxcarbazepine 300mg    No new subjective & objective note has been filed under this hospital service since the last note was generated.      Assessment - Diagnosis - Goals:     Diagnosis/Impression:   Opioid Use D/O in early Remission   Hx of Bipolar disorder by self-report  Grief    Pt with hx of NATALIYA in early remission, reported diagnoses of bipolar disorder, anxiety, depression, presenting with AMS and muscle spasms. Psych consulted to weigh in on eitology and make recommendations regarding medication regimen. On my exam, pt reported and objectively demonstrated resolution of both physical and cognitive symptoms. Denies recent substance use, denies medical history that would explain symptoms. She has been under a great deal of emotional and physical stress, which may have contributed to physical symptoms, though less convinced that it would completely explain severe cognitive symptoms and emotional dysregulation as described by team. UDS negative in ED, however it is limited in the substances that it can detect, does not rule out use of synthetics (mojo, kratom) or detect fentanyl, buprenorphine. Given patient is being monitored by the court, would have suspicion for synthetics as they wouldn't show on random UDS. Could send out for more specific mass spec analysis, though would likely not return in a timely manner.   None of patient's psychiatric meds are particularly suspicious for possible offernders, janel as pt has  been on them for a while. Would recommend restarting, particularly mirtazapine to address sleep and buprenorphine to eliminate w/d symptoms and reduce relapse risk.     Rec:   No indication for PEC/Inpatient psychiatric admission  Ok to restart home medications: Remeron 15mg, Suboxone 8/2mg bid (first dose now), oxcarbazepine    Could consider send out labs for synthetic substances to help clarify etiology of symptoms.   Continue to monitor mental status. Could consider EEG.     Time with patient: 28 min      More than 50% of the time was spent counseling/coordinating care    Consulting clinician was informed of the encounter and consult note.    Consultation ended: 11/4/2023 at 7:55pm    Sienna Rodriguez MD   Psychiatry  [facility]

## 2023-11-04 NOTE — ASSESSMENT & PLAN NOTE
No fever, clean UA and CXR  I cannot find a source of infection to explain this  Perhaps stress de-margination?    Ordered ESR, CRP, Procalcitonin  Dr. Ko notes no signs of meningitis on exam  If worsens or fever, consider LP

## 2023-11-05 VITALS
HEART RATE: 86 BPM | HEIGHT: 67 IN | RESPIRATION RATE: 16 BRPM | OXYGEN SATURATION: 96 % | SYSTOLIC BLOOD PRESSURE: 124 MMHG | DIASTOLIC BLOOD PRESSURE: 80 MMHG | BODY MASS INDEX: 29.17 KG/M2 | WEIGHT: 185.88 LBS | TEMPERATURE: 98 F

## 2023-11-05 LAB
ANION GAP SERPL CALC-SCNC: 13 MMOL/L (ref 8–16)
BASOPHILS # BLD AUTO: 0.03 K/UL (ref 0–0.2)
BASOPHILS NFR BLD: 0.3 % (ref 0–1.9)
BUN SERPL-MCNC: 10 MG/DL (ref 6–20)
CALCIUM SERPL-MCNC: 9.3 MG/DL (ref 8.7–10.5)
CHLORIDE SERPL-SCNC: 107 MMOL/L (ref 95–110)
CO2 SERPL-SCNC: 20 MMOL/L (ref 23–29)
CREAT SERPL-MCNC: 0.7 MG/DL (ref 0.5–1.4)
DIFFERENTIAL METHOD: ABNORMAL
EOSINOPHIL # BLD AUTO: 0.1 K/UL (ref 0–0.5)
EOSINOPHIL NFR BLD: 0.6 % (ref 0–8)
ERYTHROCYTE [DISTWIDTH] IN BLOOD BY AUTOMATED COUNT: 13.3 % (ref 11.5–14.5)
EST. GFR  (NO RACE VARIABLE): >60 ML/MIN/1.73 M^2
GLUCOSE SERPL-MCNC: 122 MG/DL (ref 70–110)
HCT VFR BLD AUTO: 39.2 % (ref 37–48.5)
HGB BLD-MCNC: 13.8 G/DL (ref 12–16)
IMM GRANULOCYTES # BLD AUTO: 0.06 K/UL (ref 0–0.04)
IMM GRANULOCYTES NFR BLD AUTO: 0.5 % (ref 0–0.5)
LYMPHOCYTES # BLD AUTO: 3.6 K/UL (ref 1–4.8)
LYMPHOCYTES NFR BLD: 32.4 % (ref 18–48)
MAGNESIUM SERPL-MCNC: 1.8 MG/DL (ref 1.6–2.6)
MCH RBC QN AUTO: 30.7 PG (ref 27–31)
MCHC RBC AUTO-ENTMCNC: 35.2 G/DL (ref 32–36)
MCV RBC AUTO: 87 FL (ref 82–98)
MONOCYTES # BLD AUTO: 0.8 K/UL (ref 0.3–1)
MONOCYTES NFR BLD: 7.2 % (ref 4–15)
NEUTROPHILS # BLD AUTO: 6.6 K/UL (ref 1.8–7.7)
NEUTROPHILS NFR BLD: 59 % (ref 38–73)
NRBC BLD-RTO: 0 /100 WBC
PHOSPHATE SERPL-MCNC: 2.8 MG/DL (ref 2.7–4.5)
PLATELET # BLD AUTO: 272 K/UL (ref 150–450)
PMV BLD AUTO: 10.1 FL (ref 9.2–12.9)
POTASSIUM SERPL-SCNC: 3.3 MMOL/L (ref 3.5–5.1)
RBC # BLD AUTO: 4.49 M/UL (ref 4–5.4)
SODIUM SERPL-SCNC: 140 MMOL/L (ref 136–145)
WBC # BLD AUTO: 11.15 K/UL (ref 3.9–12.7)

## 2023-11-05 PROCEDURE — 84100 ASSAY OF PHOSPHORUS: CPT | Performed by: INTERNAL MEDICINE

## 2023-11-05 PROCEDURE — G0378 HOSPITAL OBSERVATION PER HR: HCPCS

## 2023-11-05 PROCEDURE — 85025 COMPLETE CBC W/AUTO DIFF WBC: CPT | Performed by: INTERNAL MEDICINE

## 2023-11-05 PROCEDURE — 80048 BASIC METABOLIC PNL TOTAL CA: CPT | Performed by: INTERNAL MEDICINE

## 2023-11-05 PROCEDURE — 99238 PR HOSPITAL DISCHARGE DAY,<30 MIN: ICD-10-PCS | Mod: ,,, | Performed by: STUDENT IN AN ORGANIZED HEALTH CARE EDUCATION/TRAINING PROGRAM

## 2023-11-05 PROCEDURE — 25000003 PHARM REV CODE 250: Performed by: INTERNAL MEDICINE

## 2023-11-05 PROCEDURE — 83735 ASSAY OF MAGNESIUM: CPT | Performed by: INTERNAL MEDICINE

## 2023-11-05 PROCEDURE — 25000003 PHARM REV CODE 250: Performed by: STUDENT IN AN ORGANIZED HEALTH CARE EDUCATION/TRAINING PROGRAM

## 2023-11-05 PROCEDURE — 99900035 HC TECH TIME PER 15 MIN (STAT)

## 2023-11-05 PROCEDURE — 99238 HOSP IP/OBS DSCHRG MGMT 30/<: CPT | Mod: ,,, | Performed by: STUDENT IN AN ORGANIZED HEALTH CARE EDUCATION/TRAINING PROGRAM

## 2023-11-05 RX ORDER — BUPRENORPHINE AND NALOXONE 2; .5 MG/1; MG/1
4 FILM, SOLUBLE BUCCAL; SUBLINGUAL 2 TIMES DAILY
Status: DISCONTINUED | OUTPATIENT
Start: 2023-11-05 | End: 2023-11-05 | Stop reason: HOSPADM

## 2023-11-05 RX ORDER — POTASSIUM CHLORIDE 20 MEQ/1
40 TABLET, EXTENDED RELEASE ORAL ONCE
Status: COMPLETED | OUTPATIENT
Start: 2023-11-05 | End: 2023-11-05

## 2023-11-05 RX ADMIN — POTASSIUM CHLORIDE 40 MEQ: 1500 TABLET, EXTENDED RELEASE ORAL at 08:11

## 2023-11-05 RX ADMIN — MIRTAZAPINE 15 MG: 7.5 TABLET ORAL at 12:11

## 2023-11-05 NOTE — PLAN OF CARE
Problem: Gas Exchange Impaired  Goal: Optimal Gas Exchange  Outcome: Ongoing, Progressing  Intervention: Optimize Oxygenation and Ventilation  Flowsheets (Taken 11/5/2023 0700)  Airway/Ventilation Management: airway patency maintained  Head of Bed (HOB) Positioning: HOB elevated   Patient in no apparent distress. Sat's 96  % on room air. PRN treatments not needed . Will continue to monitor.

## 2023-11-05 NOTE — PLAN OF CARE
Problem: Mood Impairment (Anxiety Signs/Symptoms)  Goal: Improved Mood Symptoms (Anxiety Signs/Symptoms)  Outcome: Ongoing, Progressing  Intervention: Optimize Emotion and Mood  Flowsheets (Taken 11/5/2023 0131)  Supportive Measures: active listening utilized     Problem: Psychomotor Impairment (Psychotic Signs/Symptoms)  Goal: Improved Psychomotor Symptoms (Psychotic Signs/Symptoms)  Outcome: Ongoing, Progressing  Intervention: Manage Psychomotor Movement  Flowsheets (Taken 11/5/2023 0131)  Activity (Behavioral Health):   up ad tracey   activity encouraged     Problem: Sleep Disturbance (Psychotic Signs/Symptoms)  Goal: Improved Sleep (Psychotic Signs/Symptoms)  Outcome: Ongoing, Progressing  Intervention: Promote Healthy Sleep Hygiene  Flowsheets (Taken 11/5/2023 0131)  Sleep Hygiene Promotion:   awakenings minimized   noise level reduced

## 2023-11-06 LAB — RPR SER QL: NORMAL

## 2023-11-06 NOTE — DISCHARGE SUMMARY
[facility] Medicine  Discharge Summary      Patient Name: Felicitas Santos  MRN: [id number]  [facility]: [id number]  Patient Class: OP- Observation  Admission Date: 11/3/2023  Hospital Length of Stay: 0 days  Discharge Date and Time: 11/5/2023  8:55 AM  Attending Physician: Leeann att. providers found   Discharging Provider: Juan Langston MD  Primary Care Provider: Leeann, Primary Doctor    Primary Care Team: Networked reference to record PCT     HPI:   Ms. Santos is a 33yo lady with a past medical history of asthma, Fe deficiency anemia, opioid abuse, and bipolar disorder.  Ms. Santos is very confused and agitated on my interview, unable to provide any meaningful history.  Luckily, her sister, Brandy, is at the bedside and reports the last 24 hours events.  Brandy tells me that Ms. Santos became addicted to pain pills just shy of a year ago.  She checked herself into a local rehab facility, [facility].      After her stint inpatient, she has been completely clean for the past 8 months.  Apparently 3 months ago her psychiatry doctor from the rehab facility started her on Remeron, Suboxone and Trileptal (Oxycarbazepine).  She took all the meds for a month, then ran out of only the Trileptal.  After her doctor realized this, she called this back in, which she began taking again just recently 1 week ago.  She never stopped the Suboxone or Remeron as far as her sister is aware.    She had been doing well up until last night around 9pm when she suddenly developed agitation, flailing of her limbs, jerking movements and muscle spasms.  Her sister states she was lucid and conversant at first and told her she just could not control the movements.  They finally decided to bring her to the hospital.  Right as she arrived, Brandy then began to notice concurrent confusion and strange behavior from a mental standpoint.  She is unable to tell me if she has been taking any OTC meds to excess,  "such as benadryl, Tylenol or Robitussin SM.      In the ED her VS were BP (!) 146/79 (BP Location: Left arm, Patient Position: Lying)   Pulse 108   Temp 98 °F (36.7 °C) (Oral)   Resp 18   Ht 5' 7" (1.702 m)   Wt 81.6 kg (180 lb)   LMP 10/12/2023 (Approximate)   SpO2 98%   Breastfeeding No   BMI 28.19 kg/m².  Labs showed WBC 14.7, Cr 0.8, NML CMP, , LA 1.4, TSH 0.79, UPT neg, ETOH < 10, UDS negative, UA NML.    CXR shows no infiltrates and borderline enlarged cardiac silhouette.  CT head without contrast showed no evidence of intracranial hemorrhage.  EKG showed NSR rate 94, NS ST-T changes (flipped T waves anterolateral), QTc 477 ms.    In the ED she was treated with:  Medications   sodium chloride 0.9% bolus 1,000 mL 1,000 mL (0 mLs Intravenous Stopped 11/4/23 0018)   LORazepam injection 2 mg (2 mg Intravenous Given 11/3/23 2314)   sodium chloride 0.9% bolus 1,000 mL 1,000 mL (1,000 mLs Intravenous New Bag 11/4/23 0152)   LORazepam injection 2 mg (2 mg Intravenous Given 11/4/23 0152)   OLANZapine injection 10 mg (10 mg Intramuscular Given 11/4/23 0309)               * No surgery found *      Hospital Course:   Patient admitted for acute alteration in mental status. Patient monitored until she was back at baseline. Consulted psych who recommended restarting home medications and possible outpatient EEG. Patient medically stable for discharge. Provided discharge instructions and return precautions.       Goals of Care Treatment Preferences:  Code Status: Full Code      Consults:     Neuro  Akathisia  She is not on a true anti-psychotic agent, dopamine-blocker, so an odd dystonic reaction seems less likely  She does have rapid arm and leg flailing  Hopefully if this is medication induced (or drug) it will wear off soon  Tele psych consult        Final Active Diagnoses:    Diagnosis Date Noted POA    PRINCIPAL PROBLEM:  Delirium [R41.0] 11/04/2023 Yes    Akathisia [G25.71] 11/04/2023 Yes    " Psychiatric illness [F99] 11/04/2023 Yes    Opioid dependence in remission [F11.21] 11/04/2023 Yes    Leukocytosis [D72.829] 11/04/2023 Yes    Moderate cigarette smoker [F17.210] 11/04/2023 Yes      Problems Resolved During this Admission:       Discharged Condition: good    Disposition: Home or Self Care    Follow Up:    Patient Instructions:      Ambulatory referral/consult to Neurology   Standing Status: Future   Referral Priority: Routine Referral Type: Consultation   Referral Reason: Specialty Services Required   Requested Specialty: Neurology   Number of Visits Requested: 1     Diet Adult Regular     Notify your health care provider if you experience any of the following:  persistent dizziness, light-headedness, or visual disturbances     Notify your health care provider if you experience any of the following:  increased confusion or weakness     Activity as tolerated       Significant Diagnostic Studies:   Recent Results (from the past 168 hour(s))   Complete Blood Count (CBC)    Collection Time: 11/03/23 11:18 PM   Result Value Ref Range    WBC 14.69 (H) 3.90 - 12.70 K/uL    RBC 4.32 4.00 - 5.40 M/uL    Hemoglobin 13.1 12.0 - 16.0 g/dL    Hematocrit 38.2 37.0 - 48.5 %    MCV 88 82 - 98 fL    MCH 30.3 27.0 - 31.0 pg    MCHC 34.3 32.0 - 36.0 g/dL    RDW 13.3 11.5 - 14.5 %    Platelets 228 150 - 450 K/uL    MPV 9.9 9.2 - 12.9 fL    Immature Granulocytes 0.4 0.0 - 0.5 %    Gran # (ANC) 10.2 (H) 1.8 - 7.7 K/uL    Immature Grans (Abs) 0.06 (H) 0.00 - 0.04 K/uL    Lymph # 3.1 1.0 - 4.8 K/uL    Mono # 1.2 (H) 0.3 - 1.0 K/uL    Eos # 0.2 0.0 - 0.5 K/uL    Baso # 0.04 0.00 - 0.20 K/uL    nRBC 0 0 /100 WBC    Gran % 69.1 38.0 - 73.0 %    Lymph % 21.2 18.0 - 48.0 %    Mono % 7.9 4.0 - 15.0 %    Eosinophil % 1.1 0.0 - 8.0 %    Basophil % 0.3 0.0 - 1.9 %    Differential Method Automated    Comprehensive Metabolic Panel (CMP)    Collection Time: 11/03/23 11:18 PM   Result Value Ref Range    Sodium 136 136 - 145 mmol/L     Potassium 4.0 3.5 - 5.1 mmol/L    Chloride 103 95 - 110 mmol/L    CO2 23 23 - 29 mmol/L    Glucose 100 70 - 110 mg/dL    BUN 7 6 - 20 mg/dL    Creatinine 0.8 0.5 - 1.4 mg/dL    Calcium 9.3 8.7 - 10.5 mg/dL    Total Protein 6.9 6.0 - 8.4 g/dL    Albumin 3.8 3.5 - 5.2 g/dL    Total Bilirubin 0.4 0.1 - 1.0 mg/dL    Alkaline Phosphatase 65 55 - 135 U/L    AST 24 10 - 40 U/L    ALT 14 10 - 44 U/L    eGFR >60.0 >60 mL/min/1.73 m^2    Anion Gap 10 8 - 16 mmol/L   Lactic acid, plasma    Collection Time: 11/03/23 11:18 PM   Result Value Ref Range    Lactate (Lactic Acid) 1.4 0.5 - 2.2 mmol/L   Magnesium    Collection Time: 11/03/23 11:18 PM   Result Value Ref Range    Magnesium 1.6 1.6 - 2.6 mg/dL   TSH    Collection Time: 11/03/23 11:18 PM   Result Value Ref Range    TSH 0.787 0.400 - 4.000 uIU/mL   CK    Collection Time: 11/03/23 11:18 PM   Result Value Ref Range     20 - 180 U/L   Ethanol    Collection Time: 11/03/23 11:18 PM   Result Value Ref Range    Alcohol, Serum <10 0 - 10 mg/dL   Drug screen panel, emergency    Collection Time: 11/03/23 11:58 PM   Result Value Ref Range    Benzodiazepines Negative Negative    Methadone metabolites Negative Negative    Cocaine (Metab.) Negative Negative    Opiate Scrn, Ur Negative Negative    Barbiturate Screen, Ur Negative Negative    Amphetamine Screen, Ur Negative Negative    THC Negative Negative    Phencyclidine Negative Negative    Creatinine, Urine 29.8 15.0 - 325.0 mg/dL    Toxicology Information SEE COMMENT    Urinalysis, Reflex to Urine Culture Urine, Clean Catch    Collection Time: 11/03/23 11:58 PM    Specimen: Urine, Clean Catch   Result Value Ref Range    Specimen UA Urine, Clean Catch     Color, UA Yellow Yellow, Straw, Kerri    Appearance, UA Clear Clear    pH, UA >8.0 (A) 5.0 - 8.0    Specific Gravity, UA 1.020 1.005 - 1.030    Protein, UA Negative Negative    Glucose, UA Negative Negative    Ketones, UA Negative Negative    Bilirubin (UA) Negative  Negative    Occult Blood UA Negative Negative    Nitrite, UA Negative Negative    Urobilinogen, UA Negative Negative EU/dL    Leukocytes, UA Negative Negative   Pregnancy, urine rapid    Collection Time: 11/03/23 11:58 PM   Result Value Ref Range    Preg Test, Ur Negative    Blood culture #1 **CANNOT BE ORDERED STAT**    Collection Time: 11/04/23 12:03 AM    Specimen: Peripheral, Forearm, Right; Blood   Result Value Ref Range    Blood Culture, Routine No Growth to date     Blood Culture, Routine No Growth to date    Blood culture #2 **CANNOT BE ORDERED STAT**    Collection Time: 11/04/23 12:04 AM    Specimen: Peripheral, Forearm, Left; Blood   Result Value Ref Range    Blood Culture, Routine No Growth to date     Blood Culture, Routine No Growth to date    D-Dimer, Quantitative    Collection Time: 11/04/23  4:55 AM   Result Value Ref Range    D-Dimer 0.28 <0.50 mg/L FEU   Procalcitonin    Collection Time: 11/04/23  6:09 AM   Result Value Ref Range    Procalcitonin 0.05 <0.25 ng/mL   Sedimentation rate    Collection Time: 11/04/23  6:42 AM   Result Value Ref Range    Sed Rate 16 0 - 20 mm/Hr   C-Reactive Protein    Collection Time: 11/04/23  6:42 AM   Result Value Ref Range    CRP 45.9 (H) 0.0 - 8.2 mg/L   HIV 1/2 Ag/Ab (4th Gen)    Collection Time: 11/04/23  6:42 AM   Result Value Ref Range    HIV 1/2 Ag/Ab Non-reactive Non-reactive   Ammonia    Collection Time: 11/04/23  6:42 AM   Result Value Ref Range    Ammonia 37 10 - 50 umol/L   Basic Metabolic Panel (BMP)    Collection Time: 11/05/23  4:03 AM   Result Value Ref Range    Sodium 140 136 - 145 mmol/L    Potassium 3.3 (L) 3.5 - 5.1 mmol/L    Chloride 107 95 - 110 mmol/L    CO2 20 (L) 23 - 29 mmol/L    Glucose 122 (H) 70 - 110 mg/dL    BUN 10 6 - 20 mg/dL    Creatinine 0.7 0.5 - 1.4 mg/dL    Calcium 9.3 8.7 - 10.5 mg/dL    Anion Gap 13 8 - 16 mmol/L    eGFR >60.0 >60 mL/min/1.73 m^2   Magnesium    Collection Time: 11/05/23  4:03 AM   Result Value Ref Range     Magnesium 1.8 1.6 - 2.6 mg/dL   Phosphorus    Collection Time: 11/05/23  4:03 AM   Result Value Ref Range    Phosphorus 2.8 2.7 - 4.5 mg/dL   CBC with Automated Differential    Collection Time: 11/05/23  4:03 AM   Result Value Ref Range    WBC 11.15 3.90 - 12.70 K/uL    RBC 4.49 4.00 - 5.40 M/uL    Hemoglobin 13.8 12.0 - 16.0 g/dL    Hematocrit 39.2 37.0 - 48.5 %    MCV 87 82 - 98 fL    MCH 30.7 27.0 - 31.0 pg    MCHC 35.2 32.0 - 36.0 g/dL    RDW 13.3 11.5 - 14.5 %    Platelets 272 150 - 450 K/uL    MPV 10.1 9.2 - 12.9 fL    Immature Granulocytes 0.5 0.0 - 0.5 %    Gran # (ANC) 6.6 1.8 - 7.7 K/uL    Immature Grans (Abs) 0.06 (H) 0.00 - 0.04 K/uL    Lymph # 3.6 1.0 - 4.8 K/uL    Mono # 0.8 0.3 - 1.0 K/uL    Eos # 0.1 0.0 - 0.5 K/uL    Baso # 0.03 0.00 - 0.20 K/uL    nRBC 0 0 /100 WBC    Gran % 59.0 38.0 - 73.0 %    Lymph % 32.4 18.0 - 48.0 %    Mono % 7.2 4.0 - 15.0 %    Eosinophil % 0.6 0.0 - 8.0 %    Basophil % 0.3 0.0 - 1.9 %    Differential Method Automated      Imaging Results          X-Ray Chest AP Portable (Final result)  Result time 11/04/23 07:40:47    Final result by Jonas Andrew MD (11/04/23 07:40:47)                 Impression:      No acute cardiopulmonary abnormality appreciated radiographically.      Electronically signed by: Jethro Andrew MD  Date:    11/04/2023  Time:    07:40             Narrative:    EXAMINATION:  XR CHEST AP PORTABLE    CLINICAL HISTORY:  SHORTNESS OF BREATH;    TECHNIQUE:  A single portable semi-upright AP chest radiograph was acquired.    COMPARISON:  None    FINDINGS:  The cardiomediastinal silhouette is normal in appearance.  No pulmonary vascular congestion appreciated. No airspace consolidation or pulmonary mass. No significant volume of pleural fluid or pneumothorax. The bones are unremarkable for age.                               CT Head Without Contrast (Final result)  Result time 11/04/23 08:13:29    Final result by Jonas Andrew MD (11/04/23 08:13:29)                  Impression:      No acute intracranial abnormality appreciated.      Electronically signed by: Jethro Andrew MD  Date:    11/04/2023  Time:    08:13             Narrative:    EXAMINATION:  CT HEAD WITHOUT CONTRAST    CLINICAL HISTORY:  Mental status change, unknown cause;    TECHNIQUE:  5 mm low dose non-contrast contiguous axial images were acquired through the head.  Subsequently, 2 dimensional coronal and sagittal reconstructed images were generated from the source data.    COMPARISON:  None    FINDINGS:  The brain is normally formed with preserved gray-white matter junction differentiation. No evidence of acute/recent major vascular territory cerebral infarction, parenchymal hemorrhage, or intra-axial mass.    No hydrocephalus.  No effacement of the skull-base cisterns.  No extra-axial fluid collections or blood products.    The paranasal sinuses and mastoid air cells are clear.  The visualized orbits are unremarkable.  The bony calvarium and visualized facial bones show no acute abnormality.                              Microbiology Results (last 7 days)     ** No results found for the last 168 hours. **            Pending Diagnostic Studies:     Procedure Component Value Units Date/Time    Phosphatidylethanol (PETH) [[id number]] Collected: 11/04/23 0642    Order Status: Sent Lab Status: In process Updated: 11/04/23 1627    Specimen: Blood     RPR [[id number]] Collected: 11/04/23 0642    Order Status: Sent Lab Status: In process Updated: 11/04/23 1402    Specimen: Blood          Medications:  Reconciled Home Medications:      Medication List      CONTINUE taking these medications    buprenorphine-naloxone 8-2 mg 8-2 mg  Commonly known as: SUBOXONE  Place 1 Film under the tongue 2 (two) times daily.     mirtazapine 15 MG tablet  Commonly known as: REMERON  Take 15 mg by mouth every evening.     OXcarbazepine 300 MG Tab  Commonly known as: TRILEPTAL  Take 300 mg by mouth 3 (three) times daily.         STOP taking these medications    acyclovir 400 MG tablet  Commonly known as: ZOVIRAX            Indwelling Lines/Drains at time of discharge:   Lines/Drains/Airways     None                 Time spent on the discharge of patient: 15 minutes         Juan Langston MD  Department of Hospital Medicine  [place] - Intensive Care

## 2023-11-06 NOTE — HOSPITAL COURSE
Patient admitted for acute alteration in mental status. Patient monitored until she was back at baseline. Consulted psych who recommended restarting home medications and possible outpatient EEG. Patient medically stable for discharge. Provided discharge instructions and return precautions.

## 2023-11-09 LAB
BACTERIA BLD CULT: NORMAL
BACTERIA BLD CULT: NORMAL
CLINICAL BIOCHEMIST REVIEW: NORMAL
PLPETH BLD-MCNC: <10 NG/ML
POPETH BLD-MCNC: <10 NG/ML

## (undated) DEVICE — KIT CANNISTER AQUILEX

## (undated) DEVICE — SUT 0 VICRYL / UR6 (J603)

## (undated) DEVICE — RELOAD TRI-STAPLE 2.0 30MM

## (undated) DEVICE — DRAPE ABDOMINAL TIBURON 14X11

## (undated) DEVICE — LEGGING CLEAR POLY 2/PACK

## (undated) DEVICE — SEE MEDLINE ITEM 156964

## (undated) DEVICE — APPLICATOR CHLORAPREP ORN 26ML

## (undated) DEVICE — SUT CTD VICRYL 3-0 CR/SH

## (undated) DEVICE — DEVICE MYOSURE REACH

## (undated) DEVICE — TROCAR KII BLLN 12MM 10CM

## (undated) DEVICE — STAPLER SIGNIA TRI 2.0 ART 2.0

## (undated) DEVICE — CANNULA LAP SEAL Z THRD 5X100

## (undated) DEVICE — SOL IRR NACL .9% 3000ML

## (undated) DEVICE — BAG TISS RETRV MONARCH 10MM

## (undated) DEVICE — CANISTER SUCTION 3000CC

## (undated) DEVICE — SOL 9P NACL IRR PIC IL

## (undated) DEVICE — TUBING PNEUMO

## (undated) DEVICE — SYR BULB IRRIG ST 60 LF

## (undated) DEVICE — SPONGE LAP 18X18 PREWASHED

## (undated) DEVICE — CLIPPER BLADE MOD 4406 (CAREF)

## (undated) DEVICE — SEE MEDLINE ITEM 157181

## (undated) DEVICE — GLOVE SURGEONS ULTRA TOUCH 6.5

## (undated) DEVICE — STAPLER GIA HANDLE STD

## (undated) DEVICE — SPONGE DERMACEA GAUZE 4X4

## (undated) DEVICE — CATH 16FR URETHRL RED RUB

## (undated) DEVICE — SUT CTD VICRYL 4-0 BR PS-2

## (undated) DEVICE — SCISSOR TIP ENDOCUT DISPOSABLE

## (undated) DEVICE — IRRIGATOR SUCTION W/TIP

## (undated) DEVICE — TRAY DRY SKIN SCRUB PREP

## (undated) DEVICE — SOL CLEARIFY VISUALIZATION LAP

## (undated) DEVICE — SOL LAC RINGERS IRR 3000ML

## (undated) DEVICE — BLADE EZ CLEAN 2.5IN MODIFIED

## (undated) DEVICE — ADHESIVE DERMABOND ADVANCED

## (undated) DEVICE — TRAY FOLEY 16FR LUBRISEAL IC

## (undated) DEVICE — PAD SANITARY OB STERILE

## (undated) DEVICE — GAUZE SPONGE 4X4 12PLY

## (undated) DEVICE — TROCAR ENDO Z THREAD KII 5X100

## (undated) DEVICE — GLOVE SURG ULTRA TOUCH 7

## (undated) DEVICE — SLEEVE SCD EXPRESS CALF MEDIUM

## (undated) DEVICE — TUBE AQUILEX INFLOW

## (undated) DEVICE — SCRUB HIBICLENS 4% CHG 4OZ

## (undated) DEVICE — SYR B-D DISP CONTROL 10CC100/C

## (undated) DEVICE — DECANTER 6 VIAL

## (undated) DEVICE — ELECTRODE REM PLYHSV RETURN 9

## (undated) DEVICE — SEAL LENS SCOPE MYOSURE

## (undated) DEVICE — SOL LACTATED RINGERS 4000